# Patient Record
Sex: FEMALE | Race: WHITE | Employment: OTHER | URBAN - METROPOLITAN AREA
[De-identification: names, ages, dates, MRNs, and addresses within clinical notes are randomized per-mention and may not be internally consistent; named-entity substitution may affect disease eponyms.]

---

## 2017-09-20 ENCOUNTER — HOSPITAL ENCOUNTER (OUTPATIENT)
Dept: MAMMOGRAPHY | Age: 77
Discharge: HOME | End: 2017-09-20
Attending: FAMILY MEDICINE
Payer: MEDICARE

## 2017-09-20 DIAGNOSIS — Z12.31 VISIT FOR SCREENING MAMMOGRAM: ICD-10-CM

## 2017-09-20 PROCEDURE — 77067 SCR MAMMO BI INCL CAD: CPT

## 2019-01-08 ENCOUNTER — HOSPITAL ENCOUNTER (OUTPATIENT)
Dept: MAMMOGRAPHY | Age: 79
Discharge: HOME | End: 2019-01-08
Attending: FAMILY MEDICINE
Payer: MEDICARE

## 2019-01-08 DIAGNOSIS — Z12.39 SCREENING BREAST EXAMINATION: ICD-10-CM

## 2019-01-08 PROCEDURE — 77067 SCR MAMMO BI INCL CAD: CPT

## 2019-01-23 ENCOUNTER — APPOINTMENT (OUTPATIENT)
Dept: ULTRASOUND IMAGING | Age: 79
DRG: 417 | End: 2019-01-23
Attending: EMERGENCY MEDICINE
Payer: MEDICARE

## 2019-01-23 ENCOUNTER — APPOINTMENT (OUTPATIENT)
Dept: GENERAL RADIOLOGY | Age: 79
DRG: 417 | End: 2019-01-23
Attending: EMERGENCY MEDICINE
Payer: MEDICARE

## 2019-01-23 ENCOUNTER — APPOINTMENT (OUTPATIENT)
Dept: CT IMAGING | Age: 79
DRG: 417 | End: 2019-01-23
Attending: EMERGENCY MEDICINE
Payer: MEDICARE

## 2019-01-23 ENCOUNTER — HOSPITAL ENCOUNTER (INPATIENT)
Age: 79
LOS: 3 days | Discharge: HOME | DRG: 417 | End: 2019-01-26
Attending: EMERGENCY MEDICINE | Admitting: INTERNAL MEDICINE
Payer: MEDICARE

## 2019-01-23 DIAGNOSIS — K85.10 ACUTE BILIARY PANCREATITIS, UNSPECIFIED COMPLICATION STATUS: Primary | ICD-10-CM

## 2019-01-23 PROBLEM — R11.2 NAUSEA & VOMITING: Status: ACTIVE | Noted: 2019-01-23

## 2019-01-23 PROBLEM — R10.9 ABDOMINAL PAIN: Status: ACTIVE | Noted: 2019-01-23

## 2019-01-23 PROBLEM — E86.0 DEHYDRATION: Status: ACTIVE | Noted: 2019-01-23

## 2019-01-23 PROBLEM — D72.829 LEUKOCYTOSIS: Status: ACTIVE | Noted: 2019-01-23

## 2019-01-23 PROBLEM — K85.90 PANCREATITIS: Status: ACTIVE | Noted: 2019-01-23

## 2019-01-23 PROBLEM — R79.89 LFT ELEVATION: Status: ACTIVE | Noted: 2019-01-23

## 2019-01-23 LAB
ALBUMIN SERPL-MCNC: 3.7 G/DL (ref 3.5–5)
ALBUMIN/GLOB SERPL: 1 {RATIO} (ref 1.1–2.2)
ALP SERPL-CCNC: 181 U/L (ref 45–117)
ALT SERPL-CCNC: 192 U/L (ref 12–78)
ANION GAP SERPL CALC-SCNC: 13 MMOL/L (ref 5–15)
APPEARANCE UR: CLEAR
APTT PPP: 21.2 SEC (ref 22.1–32)
AST SERPL-CCNC: 354 U/L (ref 15–37)
ATRIAL RATE: 71 BPM
BACTERIA URNS QL MICRO: NEGATIVE /HPF
BASOPHILS # BLD: 0.1 K/UL (ref 0–0.1)
BASOPHILS NFR BLD: 1 % (ref 0–1)
BILIRUB SERPL-MCNC: 2.1 MG/DL (ref 0.2–1)
BILIRUB UR QL CFM: POSITIVE
BUN SERPL-MCNC: 24 MG/DL (ref 6–20)
BUN/CREAT SERPL: 23 (ref 12–20)
CALCIUM SERPL-MCNC: 9.4 MG/DL (ref 8.5–10.1)
CALCULATED P AXIS, ECG09: 144 DEGREES
CALCULATED R AXIS, ECG10: -25 DEGREES
CALCULATED T AXIS, ECG11: 150 DEGREES
CHLORIDE SERPL-SCNC: 101 MMOL/L (ref 97–108)
CHOLEST SERPL-MCNC: 137 MG/DL
CO2 SERPL-SCNC: 27 MMOL/L (ref 21–32)
COLOR UR: ABNORMAL
COMMENT, HOLDF: NORMAL
CREAT SERPL-MCNC: 1.06 MG/DL (ref 0.55–1.02)
DIAGNOSIS, 93000: NORMAL
DIFFERENTIAL METHOD BLD: ABNORMAL
EOSINOPHIL # BLD: 0 K/UL (ref 0–0.4)
EOSINOPHIL NFR BLD: 0 % (ref 0–7)
EPITH CASTS URNS QL MICRO: ABNORMAL /LPF
ERYTHROCYTE [DISTWIDTH] IN BLOOD BY AUTOMATED COUNT: 13.2 % (ref 11.5–14.5)
EST. AVERAGE GLUCOSE BLD GHB EST-MCNC: 154 MG/DL
GLOBULIN SER CALC-MCNC: 3.8 G/DL (ref 2–4)
GLUCOSE BLD STRIP.AUTO-MCNC: 120 MG/DL (ref 65–100)
GLUCOSE BLD STRIP.AUTO-MCNC: 140 MG/DL (ref 65–100)
GLUCOSE SERPL-MCNC: 161 MG/DL (ref 65–100)
GLUCOSE UR STRIP.AUTO-MCNC: NEGATIVE MG/DL
HBA1C MFR BLD: 7 % (ref 4.2–6.3)
HCT VFR BLD AUTO: 39.2 % (ref 35–47)
HDLC SERPL-MCNC: 54 MG/DL
HDLC SERPL: 2.5 {RATIO} (ref 0–5)
HGB BLD-MCNC: 12.3 G/DL (ref 11.5–16)
HGB UR QL STRIP: NEGATIVE
HYALINE CASTS URNS QL MICRO: ABNORMAL /LPF (ref 0–5)
IMM GRANULOCYTES # BLD AUTO: 0.1 K/UL (ref 0–0.04)
IMM GRANULOCYTES NFR BLD AUTO: 0 % (ref 0–0.5)
INR PPP: 1 (ref 0.9–1.1)
KETONES UR QL STRIP.AUTO: NEGATIVE MG/DL
LDLC SERPL CALC-MCNC: 50.2 MG/DL (ref 0–100)
LEUKOCYTE ESTERASE UR QL STRIP.AUTO: ABNORMAL
LIPASE SERPL-CCNC: >3000 U/L (ref 73–393)
LIPID PROFILE,FLP: ABNORMAL
LYMPHOCYTES # BLD: 2.2 K/UL (ref 0.8–3.5)
LYMPHOCYTES NFR BLD: 15 % (ref 12–49)
MCH RBC QN AUTO: 27.8 PG (ref 26–34)
MCHC RBC AUTO-ENTMCNC: 31.4 G/DL (ref 30–36.5)
MCV RBC AUTO: 88.7 FL (ref 80–99)
MONOCYTES # BLD: 1.1 K/UL (ref 0–1)
MONOCYTES NFR BLD: 7 % (ref 5–13)
NEUTS SEG # BLD: 11.5 K/UL (ref 1.8–8)
NEUTS SEG NFR BLD: 77 % (ref 32–75)
NITRITE UR QL STRIP.AUTO: NEGATIVE
NRBC # BLD: 0 K/UL (ref 0–0.01)
NRBC BLD-RTO: 0 PER 100 WBC
P-R INTERVAL, ECG05: 142 MS
PH UR STRIP: 5 [PH] (ref 5–8)
PLATELET # BLD AUTO: 328 K/UL (ref 150–400)
PMV BLD AUTO: 10 FL (ref 8.9–12.9)
POTASSIUM SERPL-SCNC: 3.7 MMOL/L (ref 3.5–5.1)
PROT SERPL-MCNC: 7.5 G/DL (ref 6.4–8.2)
PROT UR STRIP-MCNC: ABNORMAL MG/DL
PROTHROMBIN TIME: 10.4 SEC (ref 9–11.1)
Q-T INTERVAL, ECG07: 400 MS
QRS DURATION, ECG06: 88 MS
QTC CALCULATION (BEZET), ECG08: 434 MS
RBC # BLD AUTO: 4.42 M/UL (ref 3.8–5.2)
RBC #/AREA URNS HPF: ABNORMAL /HPF (ref 0–5)
SAMPLES BEING HELD,HOLD: NORMAL
SERVICE CMNT-IMP: ABNORMAL
SERVICE CMNT-IMP: ABNORMAL
SODIUM SERPL-SCNC: 141 MMOL/L (ref 136–145)
SP GR UR REFRACTOMETRY: 1.02 (ref 1–1.03)
THERAPEUTIC RANGE,PTTT: ABNORMAL SECS (ref 58–77)
TRIGL SERPL-MCNC: 164 MG/DL (ref ?–150)
TROPONIN I SERPL-MCNC: <0.05 NG/ML
UA: UC IF INDICATED,UAUC: ABNORMAL
UROBILINOGEN UR QL STRIP.AUTO: 1 EU/DL (ref 0.2–1)
VENTRICULAR RATE, ECG03: 71 BPM
VLDLC SERPL CALC-MCNC: 32.8 MG/DL
WBC # BLD AUTO: 14.9 K/UL (ref 3.6–11)
WBC URNS QL MICRO: ABNORMAL /HPF (ref 0–4)

## 2019-01-23 PROCEDURE — 84484 ASSAY OF TROPONIN QUANT: CPT

## 2019-01-23 PROCEDURE — 74011250636 HC RX REV CODE- 250/636: Performed by: INTERNAL MEDICINE

## 2019-01-23 PROCEDURE — 80053 COMPREHEN METABOLIC PANEL: CPT

## 2019-01-23 PROCEDURE — 85025 COMPLETE CBC W/AUTO DIFF WBC: CPT

## 2019-01-23 PROCEDURE — 74011636320 HC RX REV CODE- 636/320: Performed by: RADIOLOGY

## 2019-01-23 PROCEDURE — 83036 HEMOGLOBIN GLYCOSYLATED A1C: CPT

## 2019-01-23 PROCEDURE — 74011250636 HC RX REV CODE- 250/636: Performed by: EMERGENCY MEDICINE

## 2019-01-23 PROCEDURE — 96375 TX/PRO/DX INJ NEW DRUG ADDON: CPT

## 2019-01-23 PROCEDURE — 76705 ECHO EXAM OF ABDOMEN: CPT

## 2019-01-23 PROCEDURE — 36415 COLL VENOUS BLD VENIPUNCTURE: CPT

## 2019-01-23 PROCEDURE — 82962 GLUCOSE BLOOD TEST: CPT

## 2019-01-23 PROCEDURE — 65270000029 HC RM PRIVATE

## 2019-01-23 PROCEDURE — 96365 THER/PROPH/DIAG IV INF INIT: CPT

## 2019-01-23 PROCEDURE — 99285 EMERGENCY DEPT VISIT HI MDM: CPT

## 2019-01-23 PROCEDURE — 99284 EMERGENCY DEPT VISIT MOD MDM: CPT

## 2019-01-23 PROCEDURE — 74019 RADEX ABDOMEN 2 VIEWS: CPT

## 2019-01-23 PROCEDURE — 83690 ASSAY OF LIPASE: CPT

## 2019-01-23 PROCEDURE — 85730 THROMBOPLASTIN TIME PARTIAL: CPT

## 2019-01-23 PROCEDURE — 80061 LIPID PANEL: CPT

## 2019-01-23 PROCEDURE — 85610 PROTHROMBIN TIME: CPT

## 2019-01-23 PROCEDURE — 74177 CT ABD & PELVIS W/CONTRAST: CPT

## 2019-01-23 PROCEDURE — 81001 URINALYSIS AUTO W/SCOPE: CPT

## 2019-01-23 PROCEDURE — 74011000258 HC RX REV CODE- 258: Performed by: EMERGENCY MEDICINE

## 2019-01-23 PROCEDURE — 93005 ELECTROCARDIOGRAM TRACING: CPT

## 2019-01-23 PROCEDURE — 74011000250 HC RX REV CODE- 250: Performed by: INTERNAL MEDICINE

## 2019-01-23 RX ORDER — TIMOLOL MALEATE 2.5 MG/ML
1 SOLUTION/ DROPS OPHTHALMIC 2 TIMES DAILY
Status: DISCONTINUED | OUTPATIENT
Start: 2019-01-23 | End: 2019-01-23

## 2019-01-23 RX ORDER — ONDANSETRON 2 MG/ML
4 INJECTION INTRAMUSCULAR; INTRAVENOUS ONCE
Status: COMPLETED | OUTPATIENT
Start: 2019-01-23 | End: 2019-01-23

## 2019-01-23 RX ORDER — GUAIFENESIN 100 MG/5ML
81 LIQUID (ML) ORAL EVERY OTHER DAY
COMMUNITY

## 2019-01-23 RX ORDER — DIPHENHYDRAMINE HCL 25 MG
25 CAPSULE ORAL
Status: DISCONTINUED | OUTPATIENT
Start: 2019-01-23 | End: 2019-01-26 | Stop reason: HOSPADM

## 2019-01-23 RX ORDER — ONDANSETRON 2 MG/ML
4 INJECTION INTRAMUSCULAR; INTRAVENOUS
Status: DISCONTINUED | OUTPATIENT
Start: 2019-01-23 | End: 2019-01-26 | Stop reason: HOSPADM

## 2019-01-23 RX ORDER — OMEPRAZOLE 20 MG/1
20 CAPSULE, DELAYED RELEASE ORAL DAILY
COMMUNITY

## 2019-01-23 RX ORDER — MAGNESIUM SULFATE 100 %
4 CRYSTALS MISCELLANEOUS AS NEEDED
Status: DISCONTINUED | OUTPATIENT
Start: 2019-01-23 | End: 2019-01-26 | Stop reason: HOSPADM

## 2019-01-23 RX ORDER — INSULIN LISPRO 100 [IU]/ML
INJECTION, SOLUTION INTRAVENOUS; SUBCUTANEOUS EVERY 6 HOURS
Status: DISCONTINUED | OUTPATIENT
Start: 2019-01-23 | End: 2019-01-26

## 2019-01-23 RX ORDER — MORPHINE SULFATE 4 MG/ML
4 INJECTION INTRAVENOUS ONCE
Status: COMPLETED | OUTPATIENT
Start: 2019-01-23 | End: 2019-01-23

## 2019-01-23 RX ORDER — TIMOLOL MALEATE 2.5 MG/ML
1 SOLUTION/ DROPS OPHTHALMIC 2 TIMES DAILY
Status: DISCONTINUED | OUTPATIENT
Start: 2019-01-23 | End: 2019-01-23 | Stop reason: SDUPTHER

## 2019-01-23 RX ORDER — TRIAMTERENE AND HYDROCHLOROTHIAZIDE 37.5; 25 MG/1; MG/1
1 CAPSULE ORAL DAILY
COMMUNITY
End: 2019-01-23

## 2019-01-23 RX ORDER — NALOXONE HYDROCHLORIDE 0.4 MG/ML
0.4 INJECTION, SOLUTION INTRAMUSCULAR; INTRAVENOUS; SUBCUTANEOUS AS NEEDED
Status: DISCONTINUED | OUTPATIENT
Start: 2019-01-23 | End: 2019-01-26 | Stop reason: HOSPADM

## 2019-01-23 RX ORDER — ACETAMINOPHEN 325 MG/1
650 TABLET ORAL
Status: DISCONTINUED | OUTPATIENT
Start: 2019-01-23 | End: 2019-01-26 | Stop reason: HOSPADM

## 2019-01-23 RX ORDER — LISINOPRIL 20 MG/1
20 TABLET ORAL DAILY
COMMUNITY

## 2019-01-23 RX ORDER — PANTOPRAZOLE SODIUM 40 MG/1
40 TABLET, DELAYED RELEASE ORAL DAILY
Status: DISCONTINUED | OUTPATIENT
Start: 2019-01-24 | End: 2019-01-26 | Stop reason: HOSPADM

## 2019-01-23 RX ORDER — DEXTROSE 50 % IN WATER (D50W) INTRAVENOUS SYRINGE
25-50 AS NEEDED
Status: DISCONTINUED | OUTPATIENT
Start: 2019-01-23 | End: 2019-01-26 | Stop reason: HOSPADM

## 2019-01-23 RX ORDER — TIMOLOL MALEATE 5 MG/ML
1 SOLUTION/ DROPS OPHTHALMIC 2 TIMES DAILY
Status: DISCONTINUED | OUTPATIENT
Start: 2019-01-23 | End: 2019-01-26 | Stop reason: HOSPADM

## 2019-01-23 RX ORDER — ENOXAPARIN SODIUM 100 MG/ML
40 INJECTION SUBCUTANEOUS EVERY 24 HOURS
Status: DISCONTINUED | OUTPATIENT
Start: 2019-01-23 | End: 2019-01-25

## 2019-01-23 RX ORDER — ATORVASTATIN CALCIUM 20 MG/1
20 TABLET, FILM COATED ORAL
COMMUNITY

## 2019-01-23 RX ORDER — TIMOLOL MALEATE 5 MG/ML
1 SOLUTION/ DROPS OPHTHALMIC 2 TIMES DAILY
COMMUNITY

## 2019-01-23 RX ORDER — DEXTROSE, SODIUM CHLORIDE, AND POTASSIUM CHLORIDE 5; .45; .15 G/100ML; G/100ML; G/100ML
75 INJECTION INTRAVENOUS CONTINUOUS
Status: DISCONTINUED | OUTPATIENT
Start: 2019-01-23 | End: 2019-01-26

## 2019-01-23 RX ORDER — HYDROMORPHONE HYDROCHLORIDE 2 MG/ML
0.5 INJECTION, SOLUTION INTRAMUSCULAR; INTRAVENOUS; SUBCUTANEOUS
Status: DISCONTINUED | OUTPATIENT
Start: 2019-01-23 | End: 2019-01-26 | Stop reason: HOSPADM

## 2019-01-23 RX ADMIN — TIMOLOL MALEATE 1 DROP: 5 SOLUTION/ DROPS OPHTHALMIC at 17:31

## 2019-01-23 RX ADMIN — PIPERACILLIN SODIUM,TAZOBACTAM SODIUM 3.38 G: 3; .375 INJECTION, POWDER, FOR SOLUTION INTRAVENOUS at 09:33

## 2019-01-23 RX ADMIN — DEXTROSE MONOHYDRATE, SODIUM CHLORIDE, AND POTASSIUM CHLORIDE 125 ML/HR: 50; 4.5; 1.49 INJECTION, SOLUTION INTRAVENOUS at 20:11

## 2019-01-23 RX ADMIN — ENOXAPARIN SODIUM 40 MG: 40 INJECTION SUBCUTANEOUS at 12:09

## 2019-01-23 RX ADMIN — ONDANSETRON 4 MG: 2 INJECTION INTRAMUSCULAR; INTRAVENOUS at 07:46

## 2019-01-23 RX ADMIN — SODIUM CHLORIDE 1000 ML: 900 INJECTION, SOLUTION INTRAVENOUS at 07:46

## 2019-01-23 RX ADMIN — IOPAMIDOL 100 ML: 755 INJECTION, SOLUTION INTRAVENOUS at 10:16

## 2019-01-23 RX ADMIN — DEXTROSE MONOHYDRATE, SODIUM CHLORIDE, AND POTASSIUM CHLORIDE 125 ML/HR: 50; 4.5; 1.49 INJECTION, SOLUTION INTRAVENOUS at 12:09

## 2019-01-23 RX ADMIN — MORPHINE SULFATE 4 MG: 4 INJECTION, SOLUTION INTRAMUSCULAR; INTRAVENOUS at 07:46

## 2019-01-23 NOTE — PROGRESS NOTES
Problem: Pain  Goal: *Control of Pain  Outcome: Progressing Towards Goal  At this time controlled by pain medications given in the ER.

## 2019-01-23 NOTE — H&P
[facility]    Hospitalist Admission Note      NAME:  Abigail Jaquez   :   1940   MRN:  [id number]     PCP:  Martir Merino MD     Date/Time:  2019 11:26 AM          Subjective:     CHIEF COMPLAINT: abd pain    HISTORY OF PRESENT ILLNESS:     Ms. Star Ramírez is a 77 y.o.  female who presented to the Emergency Department complaining of abd pain. Sharp, mid upper gastric, radiating to back. Today associated with nausea and vomiting. Started 2 hours after chicken and candy bar lunch yesterday. Similar milder symptoms twice in last 2 months, both times about an hour after eating. ER workup shows pancreatitis, and US suggests gallstones. We will admit her for management. Past Medical History:   Diagnosis Date    GERD (gastroesophageal reflux disease)     Glaucoma     HTN (hypertension)     Hyperlipidemia         Past Surgical History:   Procedure Laterality Date    HX BREAST BIOPSY Left     HX ORTHOPAEDIC         Social History     Tobacco Use    Smoking status: Never Smoker    Smokeless tobacco: Never Used   Substance Use Topics    Alcohol use: No     Frequency: Never        Family History   Problem Relation Age of Onset    Diabetes Mother     COPD Mother     Coronary Artery Disease Father     Diabetes Father     Stroke Father       No Known Allergies     Prior to Admission medications    Medication Sig Start Date End Date Taking? Authorizing Provider   atorvastatin (LIPITOR) 20 mg tablet Take 20 mg by mouth daily. Yes Mayra, MD Nisreen   timolol (TIMOPTIC) 0.25 % ophthalmic solution Administer 1 Drop to both eyes two (2) times a day. Yes Mayra, MD Nisreen   triamterene-hydroCHLOROthiazide (DYAZIDE) 37.5-25 mg per capsule Take 1 Cap by mouth daily. Yes Nisreen Nunez MD   lisinopril (PRINIVIL, ZESTRIL) 20 mg tablet Take 20 mg by mouth daily. Yes Nisreen Nunez MD   aspirin 81 mg chewable tablet Take 81 mg by mouth daily.    Yes Nisreen Nunez MD omeprazole (PRILOSEC) 20 mg capsule Take 20 mg by mouth daily. Yes Other, MD Nisreen       Review of Systems:  (bold if positive, if negative)    Gen:  Eyes:  ENT:  CVS:  Pulm:  GI:  Abdominal pain, nausea, emesis,  :    MS:  Skin:  Psych:  Endo:    Hem:  Renal:    Neuro:        Objective:      VITALS:    Vital signs reviewed; most recent are:    Visit Vitals  /53 (BP 1 Location: Right arm, BP Patient Position: At rest)   Pulse 76   Temp 98.4 °F (36.9 °C)   Resp 16   Ht 5' 1\" (1.549 m)   Wt 77.6 kg (171 lb)   SpO2 100%   BMI 32.31 kg/m²     SpO2 Readings from Last 6 Encounters:   01/23/19 100%        No intake or output data in the 24 hours ending 01/23/19 1126     Exam:     Physical Exam:    Gen:  Obese, in no acute distress  HEENT:  Pink conjunctivae, PERRL, hearing intact to voice, moist mucous membranes  Neck:  Supple, without masses, thyroid non-tender  Resp:  No accessory muscle use, clear breath sounds without wheezes rales or rhonchi  Card:  No murmurs, normal S1, S2 without thrills, bruits or peripheral edema  Abd:  Soft, non-tender, non-distended, normoactive bowel sounds are present, no mass  Lymph:  No cervical or inguinal adenopathy  Musc:  No cyanosis or clubbing  Skin:  No rashes or ulcers, skin turgor is good  Neuro:  Cranial nerves are grossly intact, no focal motor weakness, follows commands appropriately  Psych:  Good insight, oriented to person, place and time, alert     Labs:    Recent Labs     01/23/19  0741   WBC 14.9*   HGB 12.3   HCT 39.2        Recent Labs     01/23/19  0741      K 3.7      CO2 27   *   BUN 24*   CREA 1.06*   CA 9.4   ALB 3.7   TBILI 2.1*   SGOT 354*   *     No results found for: GLUCPOC  No results for input(s): PH, PCO2, PO2, HCO3, FIO2 in the last 72 hours.   Recent Labs     01/23/19  0742   INR 1.0     All Micro Results     Procedure Component Value Units Date/Time    URINE CULTURE HOLD SAMPLE [[id number]]     Order Status:  Sent Specimen:  Urine           I have reviewed previous records       Assessment and Plan:      Pancreatitis / Leukocytosis / LFT elevation / Abdominal pain / Nausea & vomiting / hyperbilirubinemia - POA, presumed gallstone pancreatitis. Surgery consulted and awaiting CT and GI eval for possible ERCP. For now GI consult. NPO. IVF. Symptoms control. Elevated blood glucose - POA, no hx of DM. Check A1c. If Dx of DM will start diet and counseling. More likely this is just stress and pancreatitis. Dehydration - POA due to anorexia and vomiting. Hydrate and follow labs. Hypertension - Hold lisinopril and Dyazide in setting of dehydration and NPO. Hold ASA anticipating ERCP. Hyperlipidemia - Hold atorvastatin in setting of LFTs and NPO    Glaucoma - continue timolol.     GERD - continue PPI    Telemetry reviewed:   normal sinus rhythm    Risk of deterioration: high      Total time spent with patient: 79 Minutes Signed out to Dr Chano Rivera at 11:30 [address] discussed with: Patient, Family, Nursing Staff, Consultant/Specialist and >50% of time spent in counseling and coordination of care    Discussed:  Care Plan       ___________________________________________________    Attending Physician: Papito Lind MD

## 2019-01-23 NOTE — PROGRESS NOTES
Bedside shift change report given to Preet Quinn RN (oncoming nurse) by Ugo Elizabeth RN (offgoing nurse). Report included the following information SBAR, Kardex and MAR.

## 2019-01-23 NOTE — ED TRIAGE NOTES
Complains of abdominal pain since yesterday with vomiting this morning. Last BM yesterday. Denies diarrhea.

## 2019-01-23 NOTE — ED NOTES
TRANSFER - OUT REPORT:    Verbal report given to Irais Johnston (name) on Morena Michel  being transferred to [facility] (unit) for routine progression of care       Report consisted of patients Situation, Background, Assessment and   Recommendations(SBAR). Information from the following report(s) SBAR, ED Summary, [facility] and Recent Results was reviewed with the receiving nurse. Lines:   Peripheral IV 01/23/19 Right Antecubital (Active)   Site Assessment Clean, dry, & intact 1/23/2019  7:39 AM   Phlebitis Assessment 0 1/23/2019  7:39 AM   Infiltration Assessment 0 1/23/2019  7:39 AM   Dressing Status Clean, dry, & intact 1/23/2019  7:39 AM   Dressing Type Tape;Transparent 1/23/2019  7:39 AM   Hub Color/Line Status Pink;Flushed;Patent 1/23/2019  7:39 AM   Action Taken Blood drawn 1/23/2019  7:39 AM        Opportunity for questions and clarification was provided.       Patient transported with:   [employer]

## 2019-01-23 NOTE — PROGRESS NOTES
GI Note    CT scan results noted  Discussed with patient/family via phone results and need for ERCP. I have discussed possible ERCP, sphincterotomy, stone extraction, stent placement biopsy, alternatives, potential complications including but not limited to pancreatitis, bleeding, perforation requiring operative repair and/or blood transfusions. All questions answered.     ERCP scheduled for 1/24/19 at 12:30pm with Dr. Henok Garcia PA-C  01/23/19  3:19 PM  Zoraida Johnson MD

## 2019-01-23 NOTE — PROGRESS NOTES
[facility]: MED RECONCILIATION    Comments/Recommendations:    Patient was awake, alert and oriented with family by bedside   Patient verified no known drug allergies and updated preferred pharmacy   Patient is taking aspirin 81mg every other day, according to patient, PCP told her to start taking aspirin every other day due to excessive bruising.  Patient stated that she was taking 1/2 tablet of triamterene/HCTZ 37.5-25mg but PCP discontinued it about 2 days ago due to kidney function. Her PCP wanted to stop fluid pill until they test kidney function again in 3 months. PCP told patient to monitor her blood pressure regularly, patient checks everyday and it usually ranges from 135-140/70-80   Patient stated that she was taking 2 tablets of ibuprofen 200mg everyday, but stopped about a month ago due to renal function. If she does need pain medication, she takes tylenol.  Patient finished Z-RUDDY for sinus infection a week ago.  Patient takes 2 tablets of colchicine 0.6mg as needed for gout pain, last dose was over a week ago.  Patient did not take any of her morning medications    Medications added:     · AREDS 2    Medications removed:    · Triamterene/HCTZ 37.5-25mg-see above    Medications adjusted:    · Changed aspirin from once daily to every other day-see above    Information obtained from: Patient    Allergies: Patient has no known allergies. Prior to Admission Medications:     Medication Documentation Review Audit      Prior to Admission Medications   Prescriptions Last Dose Informant Patient Reported? Taking?   aspirin 81 mg chewable tablet 1/22/2019 at AM Self Yes Yes   Sig: Take 81 mg by mouth every other day. atorvastatin (LIPITOR) 20 mg tablet 1/22/2019 at PM Self Yes Yes   Sig: Take 20 mg by mouth nightly. lisinopril (PRINIVIL, ZESTRIL) 20 mg tablet 1/22/2019 at AM Self Yes Yes   Sig: Take 20 mg by mouth daily.    omeprazole (PRILOSEC) 20 mg capsule 1/22/2019 at AM Self Yes Yes   Sig: Take 20 mg by mouth daily. timolol (TIMOPTIC) 0.5 % ophthalmic solution 1/22/2019 at PM Self Yes Yes   Sig: Administer 1 Drop to both eyes two (2) times a day. vit A/vit C/vit E/zinc/copper (ICAPS AREDS PO) 1/22/2019 at AM Self Yes Yes   Sig: Take 1 Tab by mouth daily.       Facility-Administered Medications: None       Thank you,      Julius Bae, Pharmacy Student   Contact:   Reviewed and approved    Lay Rizzo, PharmD, BCPS

## 2019-01-23 NOTE — PROGRESS NOTES
.tfd  11:38 AM  Reason for Admission:   Pancreatitis               RRAT Score:    23              Resources/supports as identified by patient/family:   Pt lives w/ supportive family, has family nearby who can [address] facing patient (as identified by patient/family and CM): Finances/Medication cost?    No concerns               Transportation? Pt drives, family can assist w/ needs              Support system or lack thereof? Pt lives w/ supportive family                     Living arrangements? Pt lives in 1 story home w/ 1 entry step, Dtr  Vicente Rubio and  Pt's   reside w/ pt            Self-care/ADLs/Cognition? Pt reports to be independent w/ ADLs ambulatory w/o assistive device, fair health cognition          Current Advanced Directive/Advance Care Plan:  Pt does not have ACP,  is surrogate decision maker                           Plan for utilizing home health:    None, pt agreeable if needed                      Likelihood of readmission:  Moderate                 Transition of Care Plan:     Hospital admission for medical and surgical management,  d/c when stable to home w/ family assistance, f/u w/ surgery and PCP         Care Management Interventions  PCP Verified by CM: Sarah Griffiths MD, no NN, last visit 1/2019)  Palliative Care Criteria Met (RRAT>21 & CHF Dx)?: No  Mode of Transport at Discharge: Self(Family/Dtr)  Physical Therapy Consult: No  Occupational Therapy Consult: No  Current Support Network: Lives with Spouse, Own Home(pt lives in pvt residence w/  and Dtr, reports to be ambulatory, independent and drives)  Confirm Follow Up Transport: Self  Discharge Location  Discharge Placement: Home with family assistance      Floor CM will follow for d/c needs.     Kearney Goltz

## 2019-01-23 NOTE — ED PROVIDER NOTES
77 y.o. female with no significant past medical history who presents ambulatory to the ED accompanied by family member, with chief complaint of upper abdominal pain. Pt complains of upper abdominal pain that started ~1400 yesterday. She states that the pain is mostly in the upper abdomen, but radiates to the lower abdomen. Pt describes her pain as \"aching\" in quality and rates her current pain as 10/10 in intensity. Notes that she is able to ambulate, but states that ambulating does not provide any relief. Pt also reports that she vomited \"brown-miller\" emesis x 2 yesterday. Pt reports that patient has had two episodes of similar abdominal pain in the past, but states that these episodes both started at night and had resolved by the time she woke up the next morning. Notes that current pain is constant and did not resolve after sleep. She also states that she has been unable to pass gas this morning. Notes that her last bowel movement was yesterday. Pt denies any history of gall stones, cholecystectomy or any other abdominal surgeries. She notes that she currently takes Omeprazole. Pt specifically denies hematemesis and diarrhea. There are no other acute medical concerns at this time. Social hx: Negative for Tobacco use  PCP: Vasiliy Carpio MD    Note written by Angela Muniz, as dictated by Kristin Butterfield MD 7:34 AM      The history is provided by the patient and medical records. No  was used. No past medical history on file. Past Surgical History:   Procedure Laterality Date    HX BREAST BIOPSY Left 2007         No family history on file.     Social History     Socioeconomic History    Marital status:      Spouse name: Not on file    Number of children: Not on file    Years of education: Not on file    Highest education level: Not on file   Social Needs    Financial resource strain: Not on file    Food insecurity - worry: Not on file    Food insecurity - inability: Not on file    Transportation needs - medical: Not on file   Care at Hand needs - non-medical: Not on file   Occupational History    Not on file   Tobacco Use    Smoking status: Not on file   Substance and Sexual Activity    Alcohol use: Not on file    Drug use: Not on file    Sexual activity: Not on file   Other Topics Concern    Not on file   Social History Narrative    Not on file         ALLERGIES: Patient has no known allergies. Review of Systems   Gastrointestinal: Positive for abdominal pain (upper) and vomiting. Negative for diarrhea. Negative for hematemesis. All other systems reviewed and are negative. Vitals:    01/23/19 0726   BP: 152/53   Pulse: 76   Resp: 16   Temp: 98.4 °F (36.9 °C)   SpO2: 99%   Weight: 77.6 kg (171 lb)   Height: 5' 1\" (1.549 m)            Physical Exam   Constitutional: She is oriented to person, place, and time. She appears well-developed and well-nourished. No distress. HENT:   Head: Normocephalic and atraumatic. Eyes: Conjunctivae are normal. No scleral icterus. Neck: Neck supple. No tracheal deviation present. Cardiovascular: Normal rate, regular rhythm, normal heart sounds and intact distal pulses. Exam reveals no gallop and no friction rub. No murmur heard. Pulmonary/Chest: Effort normal and breath sounds normal. She has no wheezes. She has no rales. Abdominal: Soft. She exhibits no distension. There is tenderness in the right upper quadrant and epigastric area. There is guarding (slight). There is no rebound. Musculoskeletal: She exhibits no edema. Neurological: She is alert and oriented to person, place, and time. Skin: Skin is warm and dry. No rash noted. Psychiatric: She has a normal mood and affect. Nursing note and vitals reviewed.   Note written by Angela Goodman, as dictated by Robbin Trevino MD 7:34 AM    MDM  Number of Diagnoses or Management Options  Acute biliary pancreatitis, unspecified complication status:      Amount and/or Complexity of Data Reviewed  Clinical lab tests: ordered and reviewed  Tests in the radiology section of CPT®: ordered and reviewed  Tests in the medicine section of CPT®: ordered and reviewed           Procedures    ED EKG interpretation:  Time: 08:21  Rhythm: possible ectopic atrial rhythm with undetermined rhythm irregularity; Rate (approx.): 71 bpm; Axis: unusual P axis; ST/T wave: T wave abnormality, consider lateral ischemia; Note written by Angela Bustillo, as dictated by Will Bernal MD 9:09 AM    CONSULT NOTE:  10:16 AM Will Bernal MD spoke with Dr. Sue Goltz, Consult for General Surgery. Discussed available diagnostic tests and clinical findings. Dr. Sue Goltz recommends admitting the patient through hospitalist. He reports that after patient's pancreatitis is resolved, they will perform cholecystectomy. CONSULT NOTE:  10:33 AM Will Bernal MD spoke with Dr. Kevin Patel MD, Consult for Hospitalist via [facility] Text. Discussed available diagnostic tests and clinical findings. Dr. Neto Ashley will evaluate the patient for admission to the hospital.    10:33 AM  Patient is being admitted to the hospital.  The results of their tests and reasons for their admission have been discussed with them and/or available family. They convey agreement and understanding for the need to be admitted and for their admission diagnosis. Consultation will be made now with the inpatient physician for hospitalization.       Total critical care time spent exclusive of procedures: 35   minutes

## 2019-01-23 NOTE — ED NOTES
Pt Throughput: Receiving 5th floor Charge RN made aware of patient's bed placement.      Jimmy Hood RN

## 2019-01-23 NOTE — CONSULTS
Gastroenterology Consultation Note      Admit Date: 1/23/2019  Consult Date: 1/23/2019   I greatly appreciate your asking me to see Alberto Matthews, thank you very much for the opportunity to participate in her care. Narrative Assessment and Plan   · Acute pancreatitis  · Cholelithiasis/biliary sludge  · Hyperbilirubinemia  · Abnormal liver enzymes    70yo female presents with epigastric pain, nausea and vomiting. Findings consistent with acute pancreatitis likely secondary to cholelithiasis. Abdominal US without signs of biliary dilation or obstruction but limited visualization of CBD. Plan  - supportive care for pancreatitis: NPO, IV fluids, prn analgesics and antiemetics  - check triglyceride level  - await results of CT scan  - trend liver enzymes  - pending results of imaging will determine if additional evaluation indicated: if inconclusive then MRCP, if clear evidence of choledocholithiasis will need ERCP  - following    Attending evaluation (Dr. Neeta Edwards) to follow  ----------------  Neema Ware. Neeta Edwards MD  [phone] office  [phone] voicemail   I have personally reviewed the history and independently examined the patient. I have reviewed the chart and agree with the documentation recorded by the Mid Level Provider, including the assessment, treatment plan, and disposition. ASSESSMENT AND PLAN:  Gallstone pancreatitis. Intermediate risk for choledocholithiasis based on data set. Plan is CT as already ordered, observation and supportive care. Await results CT before deciding MRCP, lap jamarcus with IOC, or ERCP (although the data at current would suggest proceeding directly to ERCP not warranted.)    Following. Alysia Solorzano MD        Subjective:     Chief Complaint: abdominal pain    History of Present Illness: Alberto Matthews is a 77 y.o. female with PMH of DM, GERD, HTN, HLD who presents with complaints of epigastric pain, nausea, and vomiting.  She states that yesterday around 2pm she developed a sudden onset of epigastric pain that radiated into her back. Pain was worse with laying down and improved with sitting up in chair. Associated with nausea and several episodes of nonbilious emesis. She recalls having two prior episodes a few months ago but symptoms were mild and resolved spontaneously. Denies associated fever, chills, diarrhea. No recent abdominal trauma. Was taking NSAIDs in the past but none recently. Only new medication is colchicine as needed for gout (not associated with pancreatitis). Denies alcohol use. No know family history of pancreatic cancer. In ED: WBC 14, Tbili 2.1, , , , lipase >3000  Ultrasound shows biliary sludge and stones, no evidence of biliary obstruction/dilation    PCP:  Palmer Johnson MD    Past Medical History:   Diagnosis Date    DM type 2 causing renal disease ([facility] [place] [address]     GERD (gastroesophageal reflux disease)     Glaucoma     HTN (hypertension)     Hyperlipidemia         Past Surgical History:   Procedure Laterality Date    HX BREAST BIOPSY Left 2007    HX ORTHOPAEDIC         Social History     Tobacco Use    Smoking status: Never Smoker    Smokeless tobacco: Never Used   Substance Use Topics    Alcohol use: No     Frequency: Never        History reviewed. No pertinent family history. No Known Allergies         Home Medications:  Prior to Admission Medications   Prescriptions Last Dose Informant Patient Reported? Taking?   aspirin 81 mg chewable tablet 1/22/2019 at Unknown time  Yes Yes   Sig: Take 81 mg by mouth daily. atorvastatin (LIPITOR) 20 mg tablet 1/22/2019 at Unknown time  Yes Yes   Sig: Take 20 mg by mouth daily. lisinopril (PRINIVIL, ZESTRIL) 20 mg tablet 1/22/2019 at Unknown time  Yes Yes   Sig: Take 20 mg by mouth daily. omeprazole (PRILOSEC) 20 mg capsule 1/22/2019 at Unknown time  Yes Yes   Sig: Take 20 mg by mouth daily.    timolol (TIMOPTIC) 0.25 % ophthalmic solution 1/22/2019 at Unknown time  Yes Yes   Sig: Administer 1 Drop to both eyes two (2) times a day. triamterene-hydroCHLOROthiazide (DYAZIDE) 37.5-25 mg per capsule 1/22/2019 at Unknown time  Yes Yes   Sig: Take 1 Cap by mouth daily. Facility-Administered Medications: None       Hospital Medications:  Current Facility-Administered Medications   Medication Dose Route Frequency    iopamidol (ISOVUE-370) 76 % injection        dextrose 5% - 0.45% NaCl with KCl 20 mEq/L infusion  125 mL/hr IntraVENous CONTINUOUS    timolol (TIMOPTIC) 0.25% ophthalmic solution  1 Drop Both Eyes BID    omeprazole (PRILOSEC) capsule 20 mg  20 mg Oral DAILY    naloxone (NARCAN) injection 0.4 mg  0.4 mg IntraVENous PRN    glucose chewable tablet 16 g  4 Tab Oral PRN    dextrose (D50W) injection syrg 12.5-25 g  25-50 mL IntraVENous PRN    glucagon (GLUCAGEN) injection 1 mg  1 mg IntraMUSCular PRN    acetaminophen (TYLENOL) tablet 650 mg  650 mg Oral Q4H PRN    HYDROmorphone (PF) (DILAUDID) injection 0.5 mg  0.5 mg IntraVENous Q4H PRN    diphenhydrAMINE (BENADRYL) capsule 25 mg  25 mg Oral Q4H PRN    ondansetron (ZOFRAN) injection 4 mg  4 mg IntraVENous Q4H PRN    enoxaparin (LOVENOX) injection 40 mg  40 mg SubCUTAneous Q24H    insulin lispro (HUMALOG) injection   SubCUTAneous Q6H     Current Outpatient Medications   Medication Sig    atorvastatin (LIPITOR) 20 mg tablet Take 20 mg by mouth daily.  timolol (TIMOPTIC) 0.25 % ophthalmic solution Administer 1 Drop to both eyes two (2) times a day.  triamterene-hydroCHLOROthiazide (DYAZIDE) 37.5-25 mg per capsule Take 1 Cap by mouth daily.  lisinopril (PRINIVIL, ZESTRIL) 20 mg tablet Take 20 mg by mouth daily.  aspirin 81 mg chewable tablet Take 81 mg by mouth daily.  omeprazole (PRILOSEC) 20 mg capsule Take 20 mg by mouth daily.        Review of Systems: Admission ROS by Cindy Pelaez MD from 1/23/2019 were reviewed with the patient and changes (other than per HPI) include: none      Objective:     Physical Exam:  Visit Vitals  /53 (BP 1 Location: Right arm, BP Patient Position: At rest)   Pulse 76   Temp 98.4 °F (36.9 °C)   Resp 16   Ht 5' 1\" (1.549 m)   Wt 77.6 kg (171 lb)   SpO2 100%   BMI 32.31 kg/m²     SpO2 Readings from Last 6 Encounters:   01/23/19 100%        No intake or output data in the 24 hours ending 01/23/19 1107   General: no distress, comfortable  Skin:  No jaundice  HEENT: Pupils equal, sclera anicteric, oropharynx with no gross lesions  Cardiovascular: No abnormal audible heart sounds, well perfused, no edema  Respiratory:  No abnormal audible breath sounds, normal respiratory effort, no throacic deformity  GI:  Bowel sounds present, soft, nondistended, mild tenderness in CRISTEL to deep palpation  Musculoskeletal:  No skeletal deformity nor acute arthritis noted. Neurological:  CN II-XII grossly intact, no focal deficits  Psychiatric:  Normal affect, memory intact, appears to have insight into current illness    Laboratory:    Recent Results (from the past 24 hour(s))   SAMPLES BEING HELD    Collection Time: 01/23/19  7:41 AM   Result Value Ref Range    SAMPLES BEING HELD sst     COMMENT        Add-on orders for these samples will be processed based on acceptable specimen integrity and analyte stability, which may vary by analyte. CBC WITH AUTOMATED DIFF    Collection Time: 01/23/19  7:41 AM   Result Value Ref Range    WBC 14.9 (H) 3.6 - 11.0 K/uL    RBC 4.42 3.80 - 5.20 M/uL    HGB 12.3 11.5 - 16.0 g/dL    HCT 39.2 35.0 - 47.0 %    MCV 88.7 80.0 - 99.0 FL    MCH 27.8 26.0 - 34.0 PG    MCHC 31.4 30.0 - 36.5 g/dL    RDW 13.2 11.5 - 14.5 %    PLATELET 535 006 - 981 K/uL    MPV 10.0 8.9 - 12.9 FL    NRBC 0.0 0  WBC    ABSOLUTE NRBC 0.00 0.00 - 0.01 K/uL    NEUTROPHILS 77 (H) 32 - 75 %    LYMPHOCYTES 15 12 - 49 %    MONOCYTES 7 5 - 13 %    EOSINOPHILS 0 0 - 7 %    BASOPHILS 1 0 - 1 %    IMMATURE GRANULOCYTES 0 0.0 - 0.5 %    ABS.  NEUTROPHILS 11.5 (H) 1.8 - 8.0 K/UL    ABS. LYMPHOCYTES 2.2 0.8 - 3.5 K/UL    ABS. MONOCYTES 1.1 (H) 0.0 - 1.0 K/UL    ABS. EOSINOPHILS 0.0 0.0 - 0.4 K/UL    ABS. BASOPHILS 0.1 0.0 - 0.1 K/UL    ABS. IMM. GRANS. 0.1 (H) 0.00 - 0.04 K/UL    DF AUTOMATED     LIPASE    Collection Time: 01/23/19  7:41 AM   Result Value Ref Range    Lipase >3,000 (H) 73 - 798 U/L   METABOLIC PANEL, COMPREHENSIVE    Collection Time: 01/23/19  7:41 AM   Result Value Ref Range    Sodium 141 136 - 145 mmol/L    Potassium 3.7 3.5 - 5.1 mmol/L    Chloride 101 97 - 108 mmol/L    CO2 27 21 - 32 mmol/L    Anion gap 13 5 - 15 mmol/L    Glucose 161 (H) 65 - 100 mg/dL    BUN 24 (H) 6 - 20 MG/DL    Creatinine 1.06 (H) 0.55 - 1.02 MG/DL    BUN/Creatinine ratio 23 (H) 12 - 20      GFR est AA >60 >60 ml/min/1.73m2    GFR est non-AA 50 (L) >60 ml/min/1.73m2    Calcium 9.4 8.5 - 10.1 MG/DL    Bilirubin, total 2.1 (H) 0.2 - 1.0 MG/DL    ALT (SGPT) 192 (H) 12 - 78 U/L    AST (SGOT) 354 (H) 15 - 37 U/L    Alk.  phosphatase 181 (H) 45 - 117 U/L    Protein, total 7.5 6.4 - 8.2 g/dL    Albumin 3.7 3.5 - 5.0 g/dL    Globulin 3.8 2.0 - 4.0 g/dL    A-G Ratio 1.0 (L) 1.1 - 2.2     TROPONIN I    Collection Time: 01/23/19  7:41 AM   Result Value Ref Range    Troponin-I, Qt. <0.05 <0.05 ng/mL   URINALYSIS W/ REFLEX CULTURE    Collection Time: 01/23/19  7:42 AM   Result Value Ref Range    Color DARK YELLOW      Appearance CLEAR CLEAR      Specific gravity 1.019 1.003 - 1.030      pH (UA) 5.0 5.0 - 8.0      Protein TRACE (A) NEG mg/dL    Glucose NEGATIVE  NEG mg/dL    Ketone NEGATIVE  NEG mg/dL    Blood NEGATIVE  NEG      Urobilinogen 1.0 0.2 - 1.0 EU/dL    Nitrites NEGATIVE  NEG      Leukocyte Esterase SMALL (A) NEG      WBC 0-4 0 - 4 /hpf    RBC 0-5 0 - 5 /hpf    Epithelial cells FEW FEW /lpf    Bacteria NEGATIVE  NEG /hpf    UA:UC IF INDICATED CULTURE NOT INDICATED BY UA RESULT CNI      Hyaline cast 0-2 0 - 5 /lpf   PTT    Collection Time: 01/23/19  7:42 AM   Result Value Ref Range    aPTT 21.2 (L) 22.1 - 32.0 sec    aPTT, therapeutic range     58.0 - 77.0 SECS   PROTHROMBIN TIME + INR    Collection Time: 01/23/19  7:42 AM   Result Value Ref Range    INR 1.0 0.9 - 1.1      Prothrombin time 10.4 9.0 - 11.1 sec   BILIRUBIN, CONFIRM    Collection Time: 01/23/19  7:42 AM   Result Value Ref Range    Bilirubin UA, confirm POSITIVE (A) NEG     EKG, 12 LEAD, INITIAL    Collection Time: 01/23/19  8:21 AM   Result Value Ref Range    Ventricular Rate 71 BPM    Atrial Rate 71 BPM    P-R Interval 142 ms    QRS Duration 88 ms    Q-T Interval 400 ms    QTC Calculation (Bezet) 434 ms    Calculated P Axis 144 degrees    Calculated R Axis -25 degrees    Calculated T Axis 150 degrees    Diagnosis       Unusual P axis, possible ectopic atrial rhythm with undetermined rhythm   irregularity  T wave abnormality, consider lateral ischemia  Abnormal ECG  No previous ECGs available           Assessment/Plan:     Principal Problem:    Pancreatitis (1/23/2019)    Active Problems:    Leukocytosis (1/23/2019)      DM type 2 causing renal disease (HCC) ()      Dehydration (1/23/2019)      LFT elevation (1/23/2019)      Abdominal pain (1/23/2019)      Nausea & vomiting (1/23/2019)         See above narrative for full detail.     Deneen Coleman PA-C  01/23/19  11:07 AM

## 2019-01-23 NOTE — CONSULTS
Surgery Consult    Subjective:     I was asked to see this patient by Dr. Selma Luna for management of gallstone pancreatitis. Author Candy is a 77 y.o. female w/ PMH of HTN, hyperlipidemia, hysterectomy who presented to the ED with a 1 day history of severe epigastric pain and N/V. States pain began yesterday and was severe with radiation into her back. It was accompanied by N/V. She denies fever, chills, hematemesis, BRBPR. She states several months ago she had 2 episodes of similar pain, although both were short-lived and less severe. ED work up notable for leukocytosis, hyperbilirubinemia of 2, elevated lipase, and US showing cholelithiasis. CT demonstrated common bile duct dilation of 6 mm with a possible stone at the ampulla. Patient denies hx of pancreatitis. She denies ETOH use or recent change in medications. History reviewed. No pertinent past medical history. Past Surgical History:   Procedure Laterality Date    HX BREAST BIOPSY Left 2007    HX ORTHOPAEDIC        History reviewed. No pertinent family history. Social History     Socioeconomic History    Marital status:      Spouse name: Not on file    Number of children: Not on file    Years of education: Not on file    Highest education level: Not on file   Tobacco Use    Smoking status: Never Smoker    Smokeless tobacco: Never Used   Substance and Sexual Activity    Alcohol use: No     Frequency: Never      Current Facility-Administered Medications   Medication Dose Route Frequency    iopamidol (ISOVUE-370) 76 % injection         Current Outpatient Medications   Medication Sig    atorvastatin (LIPITOR) 20 mg tablet Take 20 mg by mouth daily.  timolol (TIMOPTIC) 0.25 % ophthalmic solution Administer 1 Drop to both eyes two (2) times a day.  triamterene-hydroCHLOROthiazide (DYAZIDE) 37.5-25 mg per capsule Take 1 Cap by mouth daily.  lisinopril (PRINIVIL, ZESTRIL) 20 mg tablet Take 20 mg by mouth daily.     aspirin 81 mg chewable tablet Take 81 mg by mouth daily.  omeprazole (PRILOSEC) 20 mg capsule Take 20 mg by mouth daily. No Known Allergies    Review of Systems:REVIEW OF SYSTEMS:     []     Unable to obtain  ROS due to  []    mental status change  []    sedated   []    intubated   [x]    Total of 12 systems reviewed as follows:    Constitutional: neg for fevers, chills, weight loss, malaise  Eyes: negative for blurry vision  Ears, nose, mouth, throat, and face: negative for sore throat  Respiratory: negative for SOB  Cardiovascular: negative for CP  Gastrointestinal: + for nausea, vomiting, abdominal pain, negative for diarrhea, constipation, melena, hematochezia  Genitourinary: negative for dysuria  Integument/breast: neg for skin rash  Hematologic/lymphatic: neg for bruising  Musculoskeletal: negative for muscle aches  Neurological: no dizziness or h/a    Objective:        Patient Vitals for the past 8 hrs:   BP Temp Pulse Resp SpO2 Height Weight   19 0915 -- -- -- -- 100 % -- --   19 0900 -- -- -- -- 97 % -- --   19 0845 -- -- -- -- 99 % -- --   19 0815 -- -- -- -- 97 % -- --   19 0800 -- -- -- -- 99 % -- --   19 0726 152/53 98.4 °F (36.9 °C) 76 16 99 % 5' 1\" (1.549 m) 77.6 kg (171 lb)       Temp (24hrs), Av.4 °F (36.9 °C), Min:98.4 °F (36.9 °C), Max:98.4 °F (36.9 °C)      Physical Exam:  General:  Alert, cooperative, no distress, appears stated age. Eyes:  Conjunctivae/corneas clear. Nose: Nares normal. Septum midline   Mouth/Throat: Lips, mucosa, and tongue normal.    Neck: Supple, symmetrical, trachea midline   Lungs:   Clear to auscultation bilaterally. Heart:  Regular rate and rhythm   Abdomen:   Soft, non-tender, non-distended, no rebound, no guarding, normal bowel sounds   Extremities: Extremities normal, atraumatic, no cyanosis or edema.    Skin: Skin color, texture, turgor normal. No rashes or lesions   Neuro: Alert, oriented, speech clear     Labs: Recent Labs     01/23/19  0741   WBC 14.9*   HGB 12.3   HCT 39.2        Recent Labs     01/23/19  0741      K 3.7      CO2 27   *   BUN 24*   CREA 1.06*   CA 9.4   ALB 3.7   TBILI 2.1*   SGOT 354*   *     Recent Labs     01/23/19  0742   INR 1.0         Assessment and Plan:     Acute pancreatitis- likely 2/2 gallstones  Hyperbilirubinemia    Epigastric pain, elevated lipase and LFTs consistent with gallstone pancreatitis. CT with common bile duct dilatation   Admit to medical service, continue supportive care, trend LFTs. Plan for ERCP with Dr. Eduarda Majano tomorrow. Recommend laparoscopic cholecystectomy prior to discharge home, once acute pancreatitis has resolved.       Kimberly Cortes MD

## 2019-01-24 ENCOUNTER — ANESTHESIA (OUTPATIENT)
Dept: ENDOSCOPY | Age: 79
DRG: 417 | End: 2019-01-24
Payer: MEDICARE

## 2019-01-24 ENCOUNTER — APPOINTMENT (OUTPATIENT)
Dept: GENERAL RADIOLOGY | Age: 79
DRG: 417 | End: 2019-01-24
Attending: SPECIALIST
Payer: MEDICARE

## 2019-01-24 ENCOUNTER — ANESTHESIA EVENT (OUTPATIENT)
Dept: ENDOSCOPY | Age: 79
DRG: 417 | End: 2019-01-24
Payer: MEDICARE

## 2019-01-24 LAB
ALBUMIN SERPL-MCNC: 2.7 G/DL (ref 3.5–5)
ALBUMIN/GLOB SERPL: 0.9 {RATIO} (ref 1.1–2.2)
ALP SERPL-CCNC: 141 U/L (ref 45–117)
ALT SERPL-CCNC: 126 U/L (ref 12–78)
ANION GAP SERPL CALC-SCNC: 9 MMOL/L (ref 5–15)
AST SERPL-CCNC: 110 U/L (ref 15–37)
BASOPHILS # BLD: 0.1 K/UL (ref 0–0.1)
BASOPHILS NFR BLD: 1 % (ref 0–1)
BILIRUB SERPL-MCNC: 0.4 MG/DL (ref 0.2–1)
BUN SERPL-MCNC: 13 MG/DL (ref 6–20)
BUN/CREAT SERPL: 14 (ref 12–20)
CALCIUM SERPL-MCNC: 8 MG/DL (ref 8.5–10.1)
CHLORIDE SERPL-SCNC: 105 MMOL/L (ref 97–108)
CO2 SERPL-SCNC: 26 MMOL/L (ref 21–32)
CREAT SERPL-MCNC: 0.92 MG/DL (ref 0.55–1.02)
DIFFERENTIAL METHOD BLD: ABNORMAL
EOSINOPHIL # BLD: 0.1 K/UL (ref 0–0.4)
EOSINOPHIL NFR BLD: 2 % (ref 0–7)
ERYTHROCYTE [DISTWIDTH] IN BLOOD BY AUTOMATED COUNT: 13.4 % (ref 11.5–14.5)
GLOBULIN SER CALC-MCNC: 3.1 G/DL (ref 2–4)
GLUCOSE BLD STRIP.AUTO-MCNC: 126 MG/DL (ref 65–100)
GLUCOSE BLD STRIP.AUTO-MCNC: 134 MG/DL (ref 65–100)
GLUCOSE BLD STRIP.AUTO-MCNC: 153 MG/DL (ref 65–100)
GLUCOSE BLD STRIP.AUTO-MCNC: 98 MG/DL (ref 65–100)
GLUCOSE SERPL-MCNC: 137 MG/DL (ref 65–100)
HCT VFR BLD AUTO: 31.9 % (ref 35–47)
HGB BLD-MCNC: 9.8 G/DL (ref 11.5–16)
IMM GRANULOCYTES # BLD AUTO: 0 K/UL (ref 0–0.04)
IMM GRANULOCYTES NFR BLD AUTO: 0 % (ref 0–0.5)
LIPASE SERPL-CCNC: 425 U/L (ref 73–393)
LYMPHOCYTES # BLD: 1.5 K/UL (ref 0.8–3.5)
LYMPHOCYTES NFR BLD: 19 % (ref 12–49)
MCH RBC QN AUTO: 27.7 PG (ref 26–34)
MCHC RBC AUTO-ENTMCNC: 30.7 G/DL (ref 30–36.5)
MCV RBC AUTO: 90.1 FL (ref 80–99)
MONOCYTES # BLD: 0.9 K/UL (ref 0–1)
MONOCYTES NFR BLD: 11 % (ref 5–13)
NEUTS SEG # BLD: 5.6 K/UL (ref 1.8–8)
NEUTS SEG NFR BLD: 68 % (ref 32–75)
NRBC # BLD: 0 K/UL (ref 0–0.01)
NRBC BLD-RTO: 0 PER 100 WBC
PLATELET # BLD AUTO: 191 K/UL (ref 150–400)
PMV BLD AUTO: 9.9 FL (ref 8.9–12.9)
POTASSIUM SERPL-SCNC: 4.3 MMOL/L (ref 3.5–5.1)
PROT SERPL-MCNC: 5.8 G/DL (ref 6.4–8.2)
RBC # BLD AUTO: 3.54 M/UL (ref 3.8–5.2)
SERVICE CMNT-IMP: ABNORMAL
SERVICE CMNT-IMP: NORMAL
SODIUM SERPL-SCNC: 140 MMOL/L (ref 136–145)
WBC # BLD AUTO: 8.2 K/UL (ref 3.6–11)

## 2019-01-24 PROCEDURE — 76060000031 HC ANESTHESIA FIRST 0.5 HR: Performed by: SPECIALIST

## 2019-01-24 PROCEDURE — 36415 COLL VENOUS BLD VENIPUNCTURE: CPT

## 2019-01-24 PROCEDURE — 77030010603 HC BLN DEV INFL BSC -B: Performed by: SPECIALIST

## 2019-01-24 PROCEDURE — C1726 CATH, BAL DIL, NON-VASCULAR: HCPCS | Performed by: SPECIALIST

## 2019-01-24 PROCEDURE — 74011250636 HC RX REV CODE- 250/636: Performed by: SPECIALIST

## 2019-01-24 PROCEDURE — 77030032490 HC SLV COMPR SCD KNE COVD -B

## 2019-01-24 PROCEDURE — 74011250636 HC RX REV CODE- 250/636: Performed by: INTERNAL MEDICINE

## 2019-01-24 PROCEDURE — 85025 COMPLETE CBC W/AUTO DIFF WBC: CPT

## 2019-01-24 PROCEDURE — 74011000250 HC RX REV CODE- 250

## 2019-01-24 PROCEDURE — 77030026438 HC STYL ET INTUB CARD -A: Performed by: ANESTHESIOLOGY

## 2019-01-24 PROCEDURE — 74011636320 HC RX REV CODE- 636/320: Performed by: SPECIALIST

## 2019-01-24 PROCEDURE — 83690 ASSAY OF LIPASE: CPT

## 2019-01-24 PROCEDURE — 76040000019: Performed by: SPECIALIST

## 2019-01-24 PROCEDURE — 77030031656 HC FCPS ENDO GRSP DISP BSC -B: Performed by: SPECIALIST

## 2019-01-24 PROCEDURE — 77030010104 HC SEAL PRT ENDOSC BYRN -B: Performed by: SPECIALIST

## 2019-01-24 PROCEDURE — 77030011761 HC SPHNTOM BILI BSC -C: Performed by: SPECIALIST

## 2019-01-24 PROCEDURE — 74011250636 HC RX REV CODE- 250/636: Performed by: FAMILY MEDICINE

## 2019-01-24 PROCEDURE — 77030009038 HC CATH BILI STN RTVR BSC -C: Performed by: SPECIALIST

## 2019-01-24 PROCEDURE — 74011250636 HC RX REV CODE- 250/636

## 2019-01-24 PROCEDURE — 77030018846 HC SOL IRR STRL H20 ICUM -A: Performed by: SPECIALIST

## 2019-01-24 PROCEDURE — BF141ZZ FLUOROSCOPY OF GALLBLADDER, BILE DUCTS AND PANCREATIC DUCTS USING LOW OSMOLAR CONTRAST: ICD-10-PCS | Performed by: SPECIALIST

## 2019-01-24 PROCEDURE — 77030013992 HC SNR POLYP ENDOSC BSC -B: Performed by: SPECIALIST

## 2019-01-24 PROCEDURE — 77030009426 HC FCPS BIOP ENDOSC BSC -B: Performed by: SPECIALIST

## 2019-01-24 PROCEDURE — 77030008684 HC TU ET CUF COVD -B: Performed by: ANESTHESIOLOGY

## 2019-01-24 PROCEDURE — 65270000029 HC RM PRIVATE

## 2019-01-24 PROCEDURE — C1769 GUIDE WIRE: HCPCS | Performed by: SPECIALIST

## 2019-01-24 PROCEDURE — 74011250637 HC RX REV CODE- 250/637: Performed by: INTERNAL MEDICINE

## 2019-01-24 PROCEDURE — 0FC98ZZ EXTIRPATION OF MATTER FROM COMMON BILE DUCT, VIA NATURAL OR ARTIFICIAL OPENING ENDOSCOPIC: ICD-10-PCS | Performed by: SPECIALIST

## 2019-01-24 PROCEDURE — 77030007288 HC DEV LOK BILI BSC -A: Performed by: SPECIALIST

## 2019-01-24 PROCEDURE — 82962 GLUCOSE BLOOD TEST: CPT

## 2019-01-24 PROCEDURE — 80053 COMPREHEN METABOLIC PANEL: CPT

## 2019-01-24 RX ORDER — SUCCINYLCHOLINE CHLORIDE 20 MG/ML
INJECTION INTRAMUSCULAR; INTRAVENOUS AS NEEDED
Status: DISCONTINUED | OUTPATIENT
Start: 2019-01-24 | End: 2019-01-24 | Stop reason: HOSPADM

## 2019-01-24 RX ORDER — FENTANYL CITRATE 50 UG/ML
INJECTION, SOLUTION INTRAMUSCULAR; INTRAVENOUS AS NEEDED
Status: DISCONTINUED | OUTPATIENT
Start: 2019-01-24 | End: 2019-01-24 | Stop reason: HOSPADM

## 2019-01-24 RX ORDER — SODIUM CHLORIDE 9 MG/ML
50 INJECTION, SOLUTION INTRAVENOUS CONTINUOUS
Status: DISPENSED | OUTPATIENT
Start: 2019-01-24 | End: 2019-01-24

## 2019-01-24 RX ORDER — DEXTROMETHORPHAN/PSEUDOEPHED 2.5-7.5/.8
1.2 DROPS ORAL
Status: DISCONTINUED | OUTPATIENT
Start: 2019-01-24 | End: 2019-01-24 | Stop reason: HOSPADM

## 2019-01-24 RX ORDER — NALOXONE HYDROCHLORIDE 0.4 MG/ML
0.4 INJECTION, SOLUTION INTRAMUSCULAR; INTRAVENOUS; SUBCUTANEOUS
Status: DISCONTINUED | OUTPATIENT
Start: 2019-01-24 | End: 2019-01-24 | Stop reason: HOSPADM

## 2019-01-24 RX ORDER — MIDAZOLAM HYDROCHLORIDE 1 MG/ML
.25-5 INJECTION, SOLUTION INTRAMUSCULAR; INTRAVENOUS AS NEEDED
Status: DISCONTINUED | OUTPATIENT
Start: 2019-01-24 | End: 2019-01-24 | Stop reason: HOSPADM

## 2019-01-24 RX ORDER — PROPOFOL 10 MG/ML
INJECTION, EMULSION INTRAVENOUS
Status: DISCONTINUED | OUTPATIENT
Start: 2019-01-24 | End: 2019-01-24 | Stop reason: HOSPADM

## 2019-01-24 RX ORDER — PROPOFOL 10 MG/ML
INJECTION, EMULSION INTRAVENOUS AS NEEDED
Status: DISCONTINUED | OUTPATIENT
Start: 2019-01-24 | End: 2019-01-24 | Stop reason: HOSPADM

## 2019-01-24 RX ORDER — FLUMAZENIL 0.1 MG/ML
0.2 INJECTION INTRAVENOUS
Status: DISCONTINUED | OUTPATIENT
Start: 2019-01-24 | End: 2019-01-24 | Stop reason: HOSPADM

## 2019-01-24 RX ORDER — FENTANYL CITRATE 50 UG/ML
25 INJECTION, SOLUTION INTRAMUSCULAR; INTRAVENOUS AS NEEDED
Status: DISCONTINUED | OUTPATIENT
Start: 2019-01-24 | End: 2019-01-24 | Stop reason: HOSPADM

## 2019-01-24 RX ORDER — LIDOCAINE HYDROCHLORIDE 20 MG/ML
INJECTION, SOLUTION EPIDURAL; INFILTRATION; INTRACAUDAL; PERINEURAL AS NEEDED
Status: DISCONTINUED | OUTPATIENT
Start: 2019-01-24 | End: 2019-01-24 | Stop reason: HOSPADM

## 2019-01-24 RX ORDER — ROCURONIUM BROMIDE 10 MG/ML
INJECTION, SOLUTION INTRAVENOUS AS NEEDED
Status: DISCONTINUED | OUTPATIENT
Start: 2019-01-24 | End: 2019-01-24 | Stop reason: HOSPADM

## 2019-01-24 RX ORDER — ATROPINE SULFATE 0.1 MG/ML
0.5 INJECTION INTRAVENOUS
Status: DISCONTINUED | OUTPATIENT
Start: 2019-01-24 | End: 2019-01-24 | Stop reason: HOSPADM

## 2019-01-24 RX ORDER — EPINEPHRINE 0.1 MG/ML
1 INJECTION INTRACARDIAC; INTRAVENOUS
Status: DISCONTINUED | OUTPATIENT
Start: 2019-01-24 | End: 2019-01-24 | Stop reason: HOSPADM

## 2019-01-24 RX ADMIN — PANTOPRAZOLE SODIUM 40 MG: 40 TABLET, DELAYED RELEASE ORAL at 08:10

## 2019-01-24 RX ADMIN — ROCURONIUM BROMIDE 5 MG: 10 INJECTION, SOLUTION INTRAVENOUS at 12:34

## 2019-01-24 RX ADMIN — IOPAMIDOL 10 ML: 612 INJECTION, SOLUTION INTRAVENOUS at 13:01

## 2019-01-24 RX ADMIN — DEXTROSE MONOHYDRATE, SODIUM CHLORIDE, AND POTASSIUM CHLORIDE 125 ML/HR: 50; 4.5; 1.49 INJECTION, SOLUTION INTRAVENOUS at 04:14

## 2019-01-24 RX ADMIN — ACETAMINOPHEN 650 MG: 325 TABLET ORAL at 07:29

## 2019-01-24 RX ADMIN — LIDOCAINE HYDROCHLORIDE 50 MG: 20 INJECTION, SOLUTION EPIDURAL; INFILTRATION; INTRACAUDAL; PERINEURAL at 12:34

## 2019-01-24 RX ADMIN — PROPOFOL 175 MCG/KG/MIN: 10 INJECTION, EMULSION INTRAVENOUS at 12:34

## 2019-01-24 RX ADMIN — ACETAMINOPHEN 650 MG: 325 TABLET ORAL at 22:45

## 2019-01-24 RX ADMIN — TIMOLOL MALEATE 1 DROP: 5 SOLUTION/ DROPS OPHTHALMIC at 08:10

## 2019-01-24 RX ADMIN — SODIUM CHLORIDE: 9 INJECTION, SOLUTION INTRAVENOUS at 12:00

## 2019-01-24 RX ADMIN — ACETAMINOPHEN 650 MG: 325 TABLET ORAL at 13:57

## 2019-01-24 RX ADMIN — DEXTROSE MONOHYDRATE, SODIUM CHLORIDE, AND POTASSIUM CHLORIDE 75 ML/HR: 50; 4.5; 1.49 INJECTION, SOLUTION INTRAVENOUS at 18:12

## 2019-01-24 RX ADMIN — ACETAMINOPHEN 650 MG: 325 TABLET ORAL at 18:12

## 2019-01-24 RX ADMIN — SUCCINYLCHOLINE CHLORIDE 100 MG: 20 INJECTION INTRAMUSCULAR; INTRAVENOUS at 12:34

## 2019-01-24 RX ADMIN — PROPOFOL 150 MG: 10 INJECTION, EMULSION INTRAVENOUS at 12:34

## 2019-01-24 RX ADMIN — TIMOLOL MALEATE 1 DROP: 5 SOLUTION/ DROPS OPHTHALMIC at 18:13

## 2019-01-24 RX ADMIN — FENTANYL CITRATE 50 MCG: 50 INJECTION, SOLUTION INTRAMUSCULAR; INTRAVENOUS at 12:34

## 2019-01-24 NOTE — DIABETES MGMT
DTC Consult Note    Recommendations/ Comments: Spoke with pt. She reports she has been told in the past that her blood sugar was elevated but denies that she has diabetes. Explained the A1c, what it means, what hers is and the target for the A1c. Provided her with an Accu Chek Guide meter and showed her how to use it. She gave a return demo and blood sugar was 121 mg/dl. Reviewed importance of good glycemic control post-op to reduce risk of infection and promote healing. Reviewed appropriate blood sugar goals and when to contact MD. Pt states she usually eats 2-3 meals per day and drinks water, coffee and occasionally a regular soda. Discussed with patient the impact of sugary beverages on blood sugars and discussed healthier beverages for her to try. Explained the importance of 3 meals per day and not skipping meals. Reviewed (handout provided) the plate method to help plan balance meals with proper portions. We discussed which foods impact blood sugars and which do not. Discussed follow up out patient education but pt declined at this time stating she wished to follow up with her PCP as she was not sure that she actually has diabetes. Our out patient contact information was provided. At d/c pt will need a prescription for Accu Chek Guide meter strips and Lancets. Current hospital DM medication: correction scale Humalog with high sensitivity. Consult received for:      [x]             New diagnosis      Chart reviewed and initial evaluation complete on [place]. Patient is a 77 y.o. female with new diagnosis of diabetes. BG monitoring at home - pt provided with an Accu Chek Guide meter and shown how to use it. Pt gave a return demo and blood sugar was 121 mg/dl. Reviewed importance of good glycemic control post-op to reduce risk of infection and promote healing.  Reviewed appropriate blood sugar goals and when to contact MD.   Assessed and instructed patient on the following:   · interpretation of lab results, blood sugar goals, hypoglycemia prevention and treatment, exercise, SMBG skills, nutrition, referred to Diabetes Educator and importance of glycemic control post-op to reduce risk of infection and promote healing. Encouraged the following:   · increased exercise- as appropriate  · dietary modifications: 3 meals per day, do not skip meals. No sugary beverages. Use plate method reviewed for meal planning and portion control   ·  regular blood sugar monitorin times daily    Provided patient with the following: [x]             Survival skills education materials               [x]             CHO counting education materials               [x]             Outpatient DTC contact number               [x]             Glucometer               Patient was able to give return demonstration of    [x]       Glucometer     Discussed with patient and/or family need for follow up appointment for diabetes management after discharge. A1c:   Lab Results   Component Value Date/Time    Hemoglobin A1c 7.0 (H) 2019 07:41 AM       Recent Glucose Results:   Lab Results   Component Value Date/Time     (H) 2019 07:37 AM    GLUCPOC 153 (H) 2019 06:17 AM    GLUCPOC 134 (H) 2019 01:07 AM    GLUCPOC 140 (H) 2019 05:24 PM        Lab Results   Component Value Date/Time    Creatinine 0.92 2019 07:37 AM     Estimated Creatinine Clearance: 46.7 mL/min (based on SCr of 0.92 mg/dL). Active Orders   Diet    DIET NPO    DIET NPO        PO intake: No data found. Will continue to follow as needed. Thank you.     Kassandra Moon RD, CDE      Time spent: 35 minutes

## 2019-01-24 NOTE — ROUTINE PROCESS
Thelma Polk  1940  [id number]    Situation:  Verbal report received from: Ryne Ellsworth RN  Procedure: Procedure(s):  ENDOSCOPIC RETROGRADE CHOLANGIOPANCREATOGRAPHY (ERCP)  SPHINCTEROTOMY  ENDOSCOPIC STONE EXTRACTION/BALLOON SWEEP    Background:    Preoperative diagnosis: CBD Stone  Postoperative diagnosis: * No post-op diagnosis entered *    :  Dr. Teresa Jeffers  Assistant(s): Endoscopy Technician-1: Merline Lake  Endoscopy RN-1: Chip Hall RN    Specimens: * No specimens in log *  H. Pylori  no    Assessment:  Intra-procedure medications     Anesthesia gave intra-procedure sedation and medications, see anesthesia flow sheet yes    Intravenous fluids: NS@ KVO     Vital signs stable     Abdominal assessment: round and soft     Recommendation:  Discharge patient per MD order.   Return to floor  Permission to share finding with family or friend yes

## 2019-01-24 NOTE — PERIOP NOTES
TRANSFER - OUT REPORT:    Verbal report given to Paris Helms  being transferred to 5th Floor  for routine progression of care       Report consisted of patients Situation, Background, Assessment and   Recommendations(SBAR). Information from the following report(s) SBAR, Recent Results and Cardiac Rhythm NSR was reviewed with the receiving nurse. Lines:   Peripheral IV 01/23/19 Right; Lower Arm (Active)   Site Assessment Clean, dry, & intact 1/24/2019  8:10 AM   Phlebitis Assessment 0 1/24/2019  8:10 AM   Infiltration Assessment 0 1/24/2019  8:10 AM   Dressing Status Clean, dry, & intact 1/24/2019  8:10 AM   Dressing Type Transparent 1/24/2019  8:10 AM   Hub Color/Line Status Pink; Infusing 1/24/2019  8:10 AM   Action Taken Open ports on tubing capped 1/24/2019  2:40 AM   Alcohol Cap Used Yes 1/24/2019  2:40 AM        Opportunity for questions and clarification was provided.       Patient transported with:   [employer]

## 2019-01-24 NOTE — ANESTHESIA PREPROCEDURE EVALUATION
Anesthetic History               Review of Systems / Medical History  Patient summary reviewed    Pulmonary  Within defined limits                 Neuro/Psych             Comments: Glaucoma Cardiovascular    Hypertension          Hyperlipidemia         GI/Hepatic/Renal     GERD: well controlled           Endo/Other    Diabetes    Anemia    Comments: Unsure why anemic  Elevated BS Other Findings              Physical Exam    Airway  Mallampati: III    Neck ROM: normal range of motion   Mouth opening: Normal     Cardiovascular    Rhythm: regular  Rate: normal         Dental         Pulmonary  Breath sounds clear to auscultation               Abdominal         Other Findings            Anesthetic Plan    ASA: 2  Anesthesia type: general            Anesthetic plan and risks discussed with: Patient      Informed consent obtained.

## 2019-01-24 NOTE — ANESTHESIA POSTPROCEDURE EVALUATION
Procedure(s):  ENDOSCOPIC RETROGRADE CHOLANGIOPANCREATOGRAPHY (ERCP)  SPHINCTEROTOMY  ENDOSCOPIC STONE EXTRACTION/BALLOON SWEEP. Anesthesia Post Evaluation      Multimodal analgesia: multimodal analgesia not used between 6 hours prior to anesthesia start to PACU discharge  Patient location during evaluation: PACU  Patient participation: complete - patient participated  Level of consciousness: awake and alert  Pain score: 0  Pain management: adequate  Airway patency: patent  Anesthetic complications: no  Cardiovascular status: hemodynamically stable and acceptable  Respiratory status: acceptable  Hydration status: acceptable  Comments: Patient seen and evaluated; swollen left upper lip.   Post anesthesia nausea and vomiting:  none      Visit Vitals  /65   Pulse 61   Temp 36.9 °C (98.5 °F)   Resp 17   Ht 5' 1\" (1.549 m)   Wt 77.6 kg (171 lb)   SpO2 97%   BMI 32.31 kg/m²

## 2019-01-24 NOTE — PROGRESS NOTES
TRANSFER - OUT REPORT:    Verbal report given to Shannon Monterroso RN(name) on Doyne Bamberger  being transferred to [facility](unit) for routine progression of care       Report consisted of patients Situation, Background, Assessment and   Recommendations(SBAR). Information from the following report(s) SBAR, Kardex and MAR was reviewed with the receiving nurse. Lines:   Peripheral IV 01/23/19 Right Antecubital (Active)   Site Assessment Clean, dry, & intact 1/24/2019  8:10 AM   Phlebitis Assessment 0 1/24/2019  8:10 AM   Infiltration Assessment 0 1/24/2019  8:10 AM   Dressing Status Clean, dry, & intact 1/24/2019  8:10 AM   Dressing Type Transparent 1/24/2019  8:10 AM   Hub Color/Line Status Pink;Capped 1/24/2019  8:10 AM   Action Taken Open ports on tubing capped 1/24/2019  2:40 AM   Alcohol Cap Used Yes 1/24/2019  2:40 AM       Peripheral IV 01/23/19 Right; Lower Arm (Active)   Site Assessment Clean, dry, & intact 1/24/2019  8:10 AM   Phlebitis Assessment 0 1/24/2019  8:10 AM   Infiltration Assessment 0 1/24/2019  8:10 AM   Dressing Status Clean, dry, & intact 1/24/2019  8:10 AM   Dressing Type Transparent 1/24/2019  8:10 AM   Hub Color/Line Status Pink; Infusing 1/24/2019  8:10 AM   Action Taken Open ports on tubing capped 1/24/2019  2:40 AM   Alcohol Cap Used Yes 1/24/2019  2:40 AM        Opportunity for questions and clarification was provided.

## 2019-01-24 NOTE — PERIOP NOTES
Adiel Jacques  1940  [id number]    Situation:    Scheduled Procedure: Procedure(s):  ENDOSCOPIC RETROGRADE CHOLANGIOPANCREATOGRAPHY (ERCP)  Verbal report received from: Yazan Charles RN  Preoperative diagnosis: CBD Stone    Background:    Procedure: Procedure(s):  ENDOSCOPIC RETROGRADE CHOLANGIOPANCREATOGRAPHY (ERCP)  Physician performing procedure; Dr. Jcarlos Mcghee MD , Dr. Deanne Torres PO Intake: NPO after midnight    Pregnancy Test:No If yes, result: none    Is the patient taking Blood Thinners: YES If yes, list: Lovenox and last taken 1/24/19  Is the patient diabetic:yes       If yes, what was the last BS:  0600    Anything given? no           Does the patient have a Pacemaker/Defibrillator in place?: no   Does the patient need antibiotics before/during/after procedure: no   Is patient on CONTACT precautions:no           Assessment:  Are the vital signs stable prior to patient coming to ENDO?  yes  Is the patient alert/oriented and able to sign consent for the procedures:yes  How does the patient's abdomen feel prior to coming to ENDO? round and soft no   Does the patient have a patient IV in place?  yes    Recommendation:  Family or Friend present no     Permission to share finding with Family or Friend yes

## 2019-01-24 NOTE — PROGRESS NOTES
General Surgery Daily Progress Note    Patient: José Manuel Watt MRN: [id number]  SSN: [id number]    YOB: 1940  Age: 77 y.o. Sex: female      Admit Date: 1/23/2019    Subjective:   Pain resolved. Denies N/V. Current Facility-Administered Medications   Medication Dose Route Frequency    dextrose 5% - 0.45% NaCl with KCl 20 mEq/L infusion  75 mL/hr IntraVENous CONTINUOUS    pantoprazole (PROTONIX) tablet 40 mg  40 mg Oral DAILY    naloxone (NARCAN) injection 0.4 mg  0.4 mg IntraVENous PRN    glucose chewable tablet 16 g  4 Tab Oral PRN    dextrose (D50W) injection syrg 12.5-25 g  25-50 mL IntraVENous PRN    glucagon (GLUCAGEN) injection 1 mg  1 mg IntraMUSCular PRN    acetaminophen (TYLENOL) tablet 650 mg  650 mg Oral Q4H PRN    HYDROmorphone (PF) (DILAUDID) injection 0.5 mg  0.5 mg IntraVENous Q4H PRN    diphenhydrAMINE (BENADRYL) capsule 25 mg  25 mg Oral Q4H PRN    ondansetron (ZOFRAN) injection 4 mg  4 mg IntraVENous Q4H PRN    enoxaparin (LOVENOX) injection 40 mg  40 mg SubCUTAneous Q24H    insulin lispro (HUMALOG) injection   SubCUTAneous Q6H    timolol (TIMOPTIC) 0.5 % ophthalmic solution 1 Drop  1 Drop Both Eyes BID        Objective:   01/24 0701 - 01/24 1900  In: 2356.7 [I.V.:2356.7]  Out: -   01/22 1901 - 01/24 0700  In: 118.8 [I.V.:118.8]  Out: -   Patient Vitals for the past 8 hrs:   BP Temp Pulse Resp SpO2   01/24/19 0758 127/60 98 °F (36.7 °C) 63 18 94 %   01/24/19 0453 110/62 98.9 °F (37.2 °C) 74 18 96 %       Physical Exam:  General: Alert, cooperative, NAD  Lungs: Unlabored  Heart:  Regular rate and  rhythm  Abdomen: Soft, non-tender, non-distended.   Extremities: Warm, moves all, no edema  Skin:  Warm and dry, no rash    Labs:   Recent Labs     01/24/19  0737   WBC 8.2   HGB 9.8*   HCT 31.9*        Recent Labs     01/24/19  0737      K 4.3      CO2 26   *   BUN 13   CREA 0.92   CA 8.0*   ALB 2.7*   TBILI 0.4   SGOT 110*   * Assessment / Plan:   · Gallstone pancreatitis  · Pain resolved  · Bilirubin normalized and transaminases trending down. ERCP planned by GI. · Plan for lap jamarcus tomorrow. NPO after midnight.

## 2019-01-24 NOTE — PROGRESS NOTES
Daily Progress Note: 1/24/2019  Pedro Pablo Dallas MD    Assessment/Plan:   Pancreatitis / Leukocytosis / LFT elevation / Abdominal pain / Nausea & vomiting / hyperbilirubinemia - POA, presumed gallstone pancreatitis. -- Surgery consulted   -- ERCP scheduled for today. -- NPO.   -- IVF. -- Pain control     Elevated blood glucose - POA, no hx of DM. -- A1c 7.0  -- Will ask diabetes ed to see     Dehydration - POA due to anorexia and vomiting.   --  Hydrate and follow labs.     Hypertension -   -- Holding lisinopril and Dyazide     -- Hold ASA anticipating ERCP.     Hyperlipidemia -   -- Holding atorvastatin in setting of LFTs and NPO     Glaucoma - continue timolol.     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 ([facility] ICD-10-CM: E11.29  ICD-9-CM: 250.40  Unknown - Present        Dehydration ICD-10-CM: E86.0  ICD-9-CM: 276.51  1/23/2019 - Present        LFT elevation ICD-10-CM: R94.5  ICD-9-CM: 790.6  1/23/2019 - Present        Abdominal pain ICD-10-CM: R10.9  ICD-9-CM: 789.00  1/23/2019 - Present        Nausea & vomiting ICD-10-CM: R11.2  ICD-9-CM: 787.01  1/23/2019 - Present        GERD (gastroesophageal reflux disease) ICD-10-CM: K21.9  ICD-9-CM: 530.81  Unknown - Present        Glaucoma ICD-10-CM: H40.9  ICD-9-CM: 365.9  Unknown - Present        HTN (hypertension) ICD-10-CM: I10  ICD-9-CM: 401.9  Unknown - Present        Hyperlipidemia ICD-10-CM: E78.5  ICD-9-CM: 272.4  Unknown - Present              HPI:   Ms. Carlos Triana is a 77 y.o.  female who presented to the Emergency Department complaining of abd pain. Sharp, mid upper gastric, radiating to back.   Today associated with nausea and vomiting. Started 2 hours after chicken and candy bar lunch yesterday. Similar milder symptoms twice in last 2 months, both times about an hour after eating. ER workup shows pancreatitis, and US suggests gallstones. We will admit her for management. (Dr Daisy Yanez)    : She is feeling better today except for a headache. No further nausea or vomiting. Less pain. Has been seen by GI and Surg. Plan for ERCP this afternoon and GB removal when pancreatitis is resolved. She is having some swelling in her right hand so will decrease fluids. AM labs pending. Review of Systems:   A comprehensive review of systems was negative except for that written in the HPI. Objective:   Physical Exam:     Visit Vitals  /62 (BP 1 Location: Right arm, BP Patient Position: At rest)   Pulse 74   Temp 98.9 °F (37.2 °C)   Resp 18   Ht 5' 1\" (1.549 m)   Wt 171 lb (77.6 kg)   SpO2 96%   BMI 32.31 kg/m²      O2 Device: Room air    Temp (24hrs), Av.7 °F (37.1 °C), Min:98.1 °F (36.7 °C), Max:99.2 °F (37.3 °C)    No intake/output data recorded.  1901 -  0700  In: 118.8 [I.V.:118.8]  Out: -     General:  Alert, cooperative, no distress, appears stated age. Head:  Normocephalic, without obvious abnormality, atraumatic. Eyes:  Conjunctivae/corneas clear. PERRL, EOMs intact. Nose: Nares normal. Septum midline. Mucosa normal. No drainage or sinus tenderness. Throat: Lips, mucosa, and tongue normal. Teeth and gums normal.   Neck: Supple, symmetrical, trachea midline, no adenopathy, thyroid: no enlargement/tenderness/nodules, no carotid bruit and no JVD. Back:   Symmetric, no curvature. ROM normal. No CVA tenderness. Lungs:   Clear to auscultation bilaterally. Chest wall:  No tenderness or deformity. Heart:  Regular rate and rhythm, S1, S2 normal, no murmur, click, rub or gallop. Abdomen:   Soft, mildly tender upper abd. Bowel sounds normal. No masses,  No organomegaly.    Extremities: Extremities normal, atraumatic, no cyanosis or edema. No calf tenderness or cords. Pulses: 2+ and symmetric all extremities. Skin: Skin color, texture, turgor normal. No rashes or lesions   Neurologic: CNII-XII intact. Alert and oriented X 3. Fine motor of hands and fingers normal.   equal.  No cogwheeling or rigidity. Gait not tested at this time. Sensation grossly normal to touch. Gross motor of extremities normal.       Data Review:       Recent Days:  Recent Labs     01/23/19  0741   WBC 14.9*   HGB 12.3   HCT 39.2        Recent Labs     01/23/19  0742 01/23/19  0741   NA  --  141   K  --  3.7   CL  --  101   CO2  --  27   GLU  --  161*   BUN  --  24*   CREA  --  1.06*   CA  --  9.4   ALB  --  3.7   TBILI  --  2.1*   SGOT  --  354*   ALT  --  192*   INR 1.0  --      No results for input(s): PH, PCO2, PO2, HCO3, FIO2 in the last 72 hours. 24 Hour Results:  Recent Results (from the past 24 hour(s))   SAMPLES BEING HELD    Collection Time: 01/23/19  7:41 AM   Result Value Ref Range    SAMPLES BEING HELD sst     COMMENT        Add-on orders for these samples will be processed based on acceptable specimen integrity and analyte stability, which may vary by analyte. CBC WITH AUTOMATED DIFF    Collection Time: 01/23/19  7:41 AM   Result Value Ref Range    WBC 14.9 (H) 3.6 - 11.0 K/uL    RBC 4.42 3.80 - 5.20 M/uL    HGB 12.3 11.5 - 16.0 g/dL    HCT 39.2 35.0 - 47.0 %    MCV 88.7 80.0 - 99.0 FL    MCH 27.8 26.0 - 34.0 PG    MCHC 31.4 30.0 - 36.5 g/dL    RDW 13.2 11.5 - 14.5 %    PLATELET 340 187 - 770 K/uL    MPV 10.0 8.9 - 12.9 FL    NRBC 0.0 0  WBC    ABSOLUTE NRBC 0.00 0.00 - 0.01 K/uL    NEUTROPHILS 77 (H) 32 - 75 %    LYMPHOCYTES 15 12 - 49 %    MONOCYTES 7 5 - 13 %    EOSINOPHILS 0 0 - 7 %    BASOPHILS 1 0 - 1 %    IMMATURE GRANULOCYTES 0 0.0 - 0.5 %    ABS. NEUTROPHILS 11.5 (H) 1.8 - 8.0 K/UL    ABS. LYMPHOCYTES 2.2 0.8 - 3.5 K/UL    ABS. MONOCYTES 1.1 (H) 0.0 - 1.0 K/UL    ABS.  EOSINOPHILS 0.0 0.0 - 0.4 K/UL    ABS. BASOPHILS 0.1 0.0 - 0.1 K/UL    ABS. IMM. GRANS. 0.1 (H) 0.00 - 0.04 K/UL    DF AUTOMATED     LIPASE    Collection Time: 01/23/19  7:41 AM   Result Value Ref Range    Lipase >3,000 (H) 73 - 208 U/L   METABOLIC PANEL, COMPREHENSIVE    Collection Time: 01/23/19  7:41 AM   Result Value Ref Range    Sodium 141 136 - 145 mmol/L    Potassium 3.7 3.5 - 5.1 mmol/L    Chloride 101 97 - 108 mmol/L    CO2 27 21 - 32 mmol/L    Anion gap 13 5 - 15 mmol/L    Glucose 161 (H) 65 - 100 mg/dL    BUN 24 (H) 6 - 20 MG/DL    Creatinine 1.06 (H) 0.55 - 1.02 MG/DL    BUN/Creatinine ratio 23 (H) 12 - 20      GFR est AA >60 >60 ml/min/1.73m2    GFR est non-AA 50 (L) >60 ml/min/1.73m2    Calcium 9.4 8.5 - 10.1 MG/DL    Bilirubin, total 2.1 (H) 0.2 - 1.0 MG/DL    ALT (SGPT) 192 (H) 12 - 78 U/L    AST (SGOT) 354 (H) 15 - 37 U/L    Alk.  phosphatase 181 (H) 45 - 117 U/L    Protein, total 7.5 6.4 - 8.2 g/dL    Albumin 3.7 3.5 - 5.0 g/dL    Globulin 3.8 2.0 - 4.0 g/dL    A-G Ratio 1.0 (L) 1.1 - 2.2     TROPONIN I    Collection Time: 01/23/19  7:41 AM   Result Value Ref Range    Troponin-I, Qt. <0.05 <0.05 ng/mL   HEMOGLOBIN A1C WITH EAG    Collection Time: 01/23/19  7:41 AM   Result Value Ref Range    Hemoglobin A1c 7.0 (H) 4.2 - 6.3 %    Est. average glucose 154 mg/dL   LIPID PANEL    Collection Time: 01/23/19  7:41 AM   Result Value Ref Range    LIPID PROFILE          Cholesterol, total 137 <200 MG/DL    Triglyceride 164 (H) <150 MG/DL    HDL Cholesterol 54 MG/DL    LDL, calculated 50.2 0 - 100 MG/DL    VLDL, calculated 32.8 MG/DL    CHOL/HDL Ratio 2.5 0 - 5.0     URINALYSIS W/ REFLEX CULTURE    Collection Time: 01/23/19  7:42 AM   Result Value Ref Range    Color DARK YELLOW      Appearance CLEAR CLEAR      Specific gravity 1.019 1.003 - 1.030      pH (UA) 5.0 5.0 - 8.0      Protein TRACE (A) NEG mg/dL    Glucose NEGATIVE  NEG mg/dL    Ketone NEGATIVE  NEG mg/dL    Blood NEGATIVE  NEG      Urobilinogen 1.0 0.2 - 1.0 EU/dL    Nitrites NEGATIVE  NEG      Leukocyte Esterase SMALL (A) NEG      WBC 0-4 0 - 4 /hpf    RBC 0-5 0 - 5 /hpf    Epithelial cells FEW FEW /lpf    Bacteria NEGATIVE  NEG /hpf    UA:UC IF INDICATED CULTURE NOT INDICATED BY UA RESULT CNI      Hyaline cast 0-2 0 - 5 /lpf   PTT    Collection Time: 01/23/19  7:42 AM   Result Value Ref Range    aPTT 21.2 (L) 22.1 - 32.0 sec    aPTT, therapeutic range     58.0 - 77.0 SECS   PROTHROMBIN TIME + INR    Collection Time: 01/23/19  7:42 AM   Result Value Ref Range    INR 1.0 0.9 - 1.1      Prothrombin time 10.4 9.0 - 11.1 sec   BILIRUBIN, CONFIRM    Collection Time: 01/23/19  7:42 AM   Result Value Ref Range    Bilirubin UA, confirm POSITIVE (A) NEG     EKG, 12 LEAD, INITIAL    Collection Time: 01/23/19  8:21 AM   Result Value Ref Range    Ventricular Rate 71 BPM    Atrial Rate 71 BPM    P-R Interval 142 ms    QRS Duration 88 ms    Q-T Interval 400 ms    QTC Calculation (Bezet) 434 ms    Calculated P Axis 144 degrees    Calculated R Axis -25 degrees    Calculated T Axis 150 degrees    Diagnosis       Unusual P axis, possible ectopic atrial rhythm with undetermined rhythm   irregularity  T wave abnormality, consider lateral ischemia  Abnormal ECG  No previous ECGs available  Confirmed by Emily Cano MD, CURT ([id number]) on 1/23/2019 12:11:37 PM     GLUCOSE, POC    Collection Time: 01/23/19 11:59 AM   Result Value Ref Range    Glucose (POC) 120 (H) 65 - 100 mg/dL    Performed by Demetrius Yates    GLUCOSE, POC    Collection Time: 01/23/19  5:24 PM   Result Value Ref Range    Glucose (POC) 140 (H) 65 - 100 mg/dL    Performed by Maribel Umana (PCT)    GLUCOSE, POC    Collection Time: 01/24/19  1:07 AM   Result Value Ref Range    Glucose (POC) 134 (H) 65 - 100 mg/dL    Performed by Radha Gutierrez Ei    GLUCOSE, POC    Collection Time: 01/24/19  6:17 AM   Result Value Ref Range    Glucose (POC) 153 (H) 65 - 100 mg/dL    Performed by Hiral White (PCT)        Medications reviewed  Current Facility-Administered Medications   Medication Dose Route Frequency    dextrose 5% - 0.45% NaCl with KCl 20 mEq/L infusion  125 mL/hr IntraVENous CONTINUOUS    pantoprazole (PROTONIX) tablet 40 mg  40 mg Oral DAILY    naloxone (NARCAN) injection 0.4 mg  0.4 mg IntraVENous PRN    glucose chewable tablet 16 g  4 Tab Oral PRN    dextrose (D50W) injection syrg 12.5-25 g  25-50 mL IntraVENous PRN    glucagon (GLUCAGEN) injection 1 mg  1 mg IntraMUSCular PRN    acetaminophen (TYLENOL) tablet 650 mg  650 mg Oral Q4H PRN    HYDROmorphone (PF) (DILAUDID) injection 0.5 mg  0.5 mg IntraVENous Q4H PRN    diphenhydrAMINE (BENADRYL) capsule 25 mg  25 mg Oral Q4H PRN    ondansetron (ZOFRAN) injection 4 mg  4 mg IntraVENous Q4H PRN    enoxaparin (LOVENOX) injection 40 mg  40 mg SubCUTAneous Q24H    insulin lispro (HUMALOG) injection   SubCUTAneous Q6H    timolol (TIMOPTIC) 0.5 % ophthalmic solution 1 Drop  1 Drop Both Eyes BID       Care Plan discussed with: Patient/Family    Total time spent with patient and review of records: 30 minutes.     Brianne Driscoll MD

## 2019-01-24 NOTE — PROGRESS NOTES
Patient visited by [facility] Partner Volunteer in Medical Surgical Unit on 1/24/2019. Rev.  Shiela Adams, [address] paging service: [phone]

## 2019-01-24 NOTE — PROGRESS NOTES
Bedside and Verbal shift change report given to Daphne Ashraf RN (oncoming nurse) by Zehra Sinclair RN (offgoing nurse). Report included the following information SBAR, Kardex, Intake/Output, MAR, Recent Results and Med Rec Status.

## 2019-01-24 NOTE — PROGRESS NOTES
Bedside shift change report given to Alfredo Robert RN (oncoming nurse) by Komal Vincent RN (offgoing nurse). Report included the following information SBAR, Kardex and MAR.

## 2019-01-24 NOTE — PROGRESS NOTES
Bedside and Verbal shift change report given to Lauren rn  (oncoming nurse) by Diandra Pinzon  (offgoing nurse). Report included the following information SBAR and Kardex.

## 2019-01-25 ENCOUNTER — ANESTHESIA EVENT (OUTPATIENT)
Dept: SURGERY | Age: 79
DRG: 417 | End: 2019-01-25
Payer: MEDICARE

## 2019-01-25 ENCOUNTER — ANESTHESIA (OUTPATIENT)
Dept: SURGERY | Age: 79
DRG: 417 | End: 2019-01-25
Payer: MEDICARE

## 2019-01-25 LAB
ALBUMIN SERPL-MCNC: 2.5 G/DL (ref 3.5–5)
ALBUMIN/GLOB SERPL: 0.7 {RATIO} (ref 1.1–2.2)
ALP SERPL-CCNC: 136 U/L (ref 45–117)
ALT SERPL-CCNC: 91 U/L (ref 12–78)
ANION GAP SERPL CALC-SCNC: 7 MMOL/L (ref 5–15)
AST SERPL-CCNC: 54 U/L (ref 15–37)
BASOPHILS # BLD: 0 K/UL (ref 0–0.1)
BASOPHILS NFR BLD: 0 % (ref 0–1)
BILIRUB SERPL-MCNC: 0.4 MG/DL (ref 0.2–1)
BUN SERPL-MCNC: 12 MG/DL (ref 6–20)
BUN/CREAT SERPL: 13 (ref 12–20)
CALCIUM SERPL-MCNC: 8.3 MG/DL (ref 8.5–10.1)
CHLORIDE SERPL-SCNC: 108 MMOL/L (ref 97–108)
CO2 SERPL-SCNC: 27 MMOL/L (ref 21–32)
CREAT SERPL-MCNC: 0.9 MG/DL (ref 0.55–1.02)
DIFFERENTIAL METHOD BLD: ABNORMAL
EOSINOPHIL # BLD: 0.2 K/UL (ref 0–0.4)
EOSINOPHIL NFR BLD: 2 % (ref 0–7)
ERYTHROCYTE [DISTWIDTH] IN BLOOD BY AUTOMATED COUNT: 13.2 % (ref 11.5–14.5)
GLOBULIN SER CALC-MCNC: 3.4 G/DL (ref 2–4)
GLUCOSE BLD STRIP.AUTO-MCNC: 127 MG/DL (ref 65–100)
GLUCOSE BLD STRIP.AUTO-MCNC: 131 MG/DL (ref 65–100)
GLUCOSE BLD STRIP.AUTO-MCNC: 135 MG/DL (ref 65–100)
GLUCOSE BLD STRIP.AUTO-MCNC: 189 MG/DL (ref 65–100)
GLUCOSE BLD STRIP.AUTO-MCNC: 220 MG/DL (ref 65–100)
GLUCOSE SERPL-MCNC: 121 MG/DL (ref 65–100)
HCT VFR BLD AUTO: 32.9 % (ref 35–47)
HGB BLD-MCNC: 9.7 G/DL (ref 11.5–16)
IMM GRANULOCYTES # BLD AUTO: 0 K/UL (ref 0–0.04)
IMM GRANULOCYTES NFR BLD AUTO: 1 % (ref 0–0.5)
LIPASE SERPL-CCNC: 176 U/L (ref 73–393)
LYMPHOCYTES # BLD: 1.8 K/UL (ref 0.8–3.5)
LYMPHOCYTES NFR BLD: 22 % (ref 12–49)
MCH RBC QN AUTO: 26.8 PG (ref 26–34)
MCHC RBC AUTO-ENTMCNC: 29.5 G/DL (ref 30–36.5)
MCV RBC AUTO: 90.9 FL (ref 80–99)
MONOCYTES # BLD: 0.8 K/UL (ref 0–1)
MONOCYTES NFR BLD: 11 % (ref 5–13)
NEUTS SEG # BLD: 5.1 K/UL (ref 1.8–8)
NEUTS SEG NFR BLD: 64 % (ref 32–75)
NRBC # BLD: 0 K/UL (ref 0–0.01)
NRBC BLD-RTO: 0 PER 100 WBC
PLATELET # BLD AUTO: 190 K/UL (ref 150–400)
PMV BLD AUTO: 9.5 FL (ref 8.9–12.9)
POTASSIUM SERPL-SCNC: 4.2 MMOL/L (ref 3.5–5.1)
PROT SERPL-MCNC: 5.9 G/DL (ref 6.4–8.2)
RBC # BLD AUTO: 3.62 M/UL (ref 3.8–5.2)
SERVICE CMNT-IMP: ABNORMAL
SODIUM SERPL-SCNC: 142 MMOL/L (ref 136–145)
WBC # BLD AUTO: 7.9 K/UL (ref 3.6–11)

## 2019-01-25 PROCEDURE — 77030002966 HC SUT PDS J&J -A: Performed by: SURGERY

## 2019-01-25 PROCEDURE — 74011000250 HC RX REV CODE- 250

## 2019-01-25 PROCEDURE — 82962 GLUCOSE BLOOD TEST: CPT

## 2019-01-25 PROCEDURE — 74011250637 HC RX REV CODE- 250/637: Performed by: INTERNAL MEDICINE

## 2019-01-25 PROCEDURE — 74011636637 HC RX REV CODE- 636/637: Performed by: INTERNAL MEDICINE

## 2019-01-25 PROCEDURE — 77030002933 HC SUT MCRYL J&J -A: Performed by: SURGERY

## 2019-01-25 PROCEDURE — 77030011640 HC PAD GRND REM COVD -A: Performed by: SURGERY

## 2019-01-25 PROCEDURE — 83690 ASSAY OF LIPASE: CPT

## 2019-01-25 PROCEDURE — 88304 TISSUE EXAM BY PATHOLOGIST: CPT

## 2019-01-25 PROCEDURE — 77030035278 HC STPLR SEAL ENDOWR INTU -B: Performed by: SURGERY

## 2019-01-25 PROCEDURE — 74011250636 HC RX REV CODE- 250/636: Performed by: ANESTHESIOLOGY

## 2019-01-25 PROCEDURE — 77030020263 HC SOL INJ SOD CL0.9% LFCR 1000ML: Performed by: SURGERY

## 2019-01-25 PROCEDURE — 77030018836 HC SOL IRR NACL ICUM -A: Performed by: SURGERY

## 2019-01-25 PROCEDURE — 80053 COMPREHEN METABOLIC PANEL: CPT

## 2019-01-25 PROCEDURE — 74011250636 HC RX REV CODE- 250/636: Performed by: SURGERY

## 2019-01-25 PROCEDURE — 0DNW4ZZ RELEASE PERITONEUM, PERCUTANEOUS ENDOSCOPIC APPROACH: ICD-10-PCS | Performed by: SURGERY

## 2019-01-25 PROCEDURE — 74011000250 HC RX REV CODE- 250: Performed by: SURGERY

## 2019-01-25 PROCEDURE — 74011250636 HC RX REV CODE- 250/636

## 2019-01-25 PROCEDURE — 36415 COLL VENOUS BLD VENIPUNCTURE: CPT

## 2019-01-25 PROCEDURE — 74011250636 HC RX REV CODE- 250/636: Performed by: FAMILY MEDICINE

## 2019-01-25 PROCEDURE — 77030027744 HC PWDR HEMSTAT ARISTA ABSRB 5GM BARD -D: Performed by: SURGERY

## 2019-01-25 PROCEDURE — 77030033188 HC TBNG FLTRD BIIFUR DISP CNMD -C: Performed by: SURGERY

## 2019-01-25 PROCEDURE — 77030020703 HC SEAL CANN DISP INTU -B: Performed by: SURGERY

## 2019-01-25 PROCEDURE — 77030035489 HC REDUCR CANN ENDOWR INTU -C: Performed by: SURGERY

## 2019-01-25 PROCEDURE — 77030027743 HC APPL F/HEMSTAT BARD -B: Performed by: SURGERY

## 2019-01-25 PROCEDURE — 77030026438 HC STYL ET INTUB CARD -A: Performed by: NURSE ANESTHETIST, CERTIFIED REGISTERED

## 2019-01-25 PROCEDURE — 74011000254 HC RX REV CODE- 254: Performed by: SURGERY

## 2019-01-25 PROCEDURE — 77030035048 HC TRCR ENDOSC OPTCL COVD -B: Performed by: SURGERY

## 2019-01-25 PROCEDURE — 8E0W4CZ ROBOTIC ASSISTED PROCEDURE OF TRUNK REGION, PERCUTANEOUS ENDOSCOPIC APPROACH: ICD-10-PCS | Performed by: SURGERY

## 2019-01-25 PROCEDURE — 65270000029 HC RM PRIVATE

## 2019-01-25 PROCEDURE — 76060000034 HC ANESTHESIA 1.5 TO 2 HR: Performed by: SURGERY

## 2019-01-25 PROCEDURE — 77030008756 HC TU IRR SUC STRY -B: Performed by: SURGERY

## 2019-01-25 PROCEDURE — 77030039266 HC ADH SKN EXOFIN S2SG -A: Performed by: SURGERY

## 2019-01-25 PROCEDURE — 77030009852 HC PCH RTVR ENDOSC COVD -B: Performed by: SURGERY

## 2019-01-25 PROCEDURE — 77030032490 HC SLV COMPR SCD KNE COVD -B: Performed by: SURGERY

## 2019-01-25 PROCEDURE — 77030008684 HC TU ET CUF COVD -B: Performed by: NURSE ANESTHETIST, CERTIFIED REGISTERED

## 2019-01-25 PROCEDURE — 77030016151 HC PROTCTR LNS DFOG COVD -B: Performed by: SURGERY

## 2019-01-25 PROCEDURE — 77030039429 HC SUT PASSR CROSSBOW DISP ADLR -B: Performed by: SURGERY

## 2019-01-25 PROCEDURE — 76210000006 HC OR PH I REC 0.5 TO 1 HR: Performed by: SURGERY

## 2019-01-25 PROCEDURE — 74011250636 HC RX REV CODE- 250/636: Performed by: INTERNAL MEDICINE

## 2019-01-25 PROCEDURE — 77030010939 HC CLP LIG TELE -B: Performed by: SURGERY

## 2019-01-25 PROCEDURE — 76010000875 HC OR TIME 1.5 TO 2HR INTENSV - TIER 2: Performed by: SURGERY

## 2019-01-25 PROCEDURE — 77030009848 HC PASSR SUT SET COOP -C: Performed by: SURGERY

## 2019-01-25 PROCEDURE — 77030035277 HC OBTRTR BLDELSS DISP INTU -B: Performed by: SURGERY

## 2019-01-25 PROCEDURE — 85025 COMPLETE CBC W/AUTO DIFF WBC: CPT

## 2019-01-25 PROCEDURE — 0FT44ZZ RESECTION OF GALLBLADDER, PERCUTANEOUS ENDOSCOPIC APPROACH: ICD-10-PCS | Performed by: SURGERY

## 2019-01-25 PROCEDURE — 77030020782 HC GWN BAIR PAWS FLX 3M -B

## 2019-01-25 RX ORDER — ROCURONIUM BROMIDE 10 MG/ML
INJECTION, SOLUTION INTRAVENOUS AS NEEDED
Status: DISCONTINUED | OUTPATIENT
Start: 2019-01-25 | End: 2019-01-25 | Stop reason: HOSPADM

## 2019-01-25 RX ORDER — OXYCODONE AND ACETAMINOPHEN 10; 325 MG/1; MG/1
1 TABLET ORAL
Status: DISCONTINUED | OUTPATIENT
Start: 2019-01-25 | End: 2019-01-26 | Stop reason: HOSPADM

## 2019-01-25 RX ORDER — ONDANSETRON 4 MG/1
4 TABLET, ORALLY DISINTEGRATING ORAL
Qty: 10 TAB | Refills: 1 | Status: SHIPPED | OUTPATIENT
Start: 2019-01-25 | End: 2022-08-03

## 2019-01-25 RX ORDER — DEXAMETHASONE SODIUM PHOSPHATE 4 MG/ML
INJECTION, SOLUTION INTRA-ARTICULAR; INTRALESIONAL; INTRAMUSCULAR; INTRAVENOUS; SOFT TISSUE AS NEEDED
Status: DISCONTINUED | OUTPATIENT
Start: 2019-01-25 | End: 2019-01-25 | Stop reason: HOSPADM

## 2019-01-25 RX ORDER — SODIUM CHLORIDE, SODIUM LACTATE, POTASSIUM CHLORIDE, CALCIUM CHLORIDE 600; 310; 30; 20 MG/100ML; MG/100ML; MG/100ML; MG/100ML
INJECTION, SOLUTION INTRAVENOUS
Status: DISCONTINUED | OUTPATIENT
Start: 2019-01-25 | End: 2019-01-25 | Stop reason: HOSPADM

## 2019-01-25 RX ORDER — SUCCINYLCHOLINE CHLORIDE 20 MG/ML
INJECTION INTRAMUSCULAR; INTRAVENOUS AS NEEDED
Status: DISCONTINUED | OUTPATIENT
Start: 2019-01-25 | End: 2019-01-25 | Stop reason: HOSPADM

## 2019-01-25 RX ORDER — ONDANSETRON 2 MG/ML
INJECTION INTRAMUSCULAR; INTRAVENOUS AS NEEDED
Status: DISCONTINUED | OUTPATIENT
Start: 2019-01-25 | End: 2019-01-25 | Stop reason: HOSPADM

## 2019-01-25 RX ORDER — FENTANYL CITRATE 50 UG/ML
INJECTION, SOLUTION INTRAMUSCULAR; INTRAVENOUS AS NEEDED
Status: DISCONTINUED | OUTPATIENT
Start: 2019-01-25 | End: 2019-01-25 | Stop reason: HOSPADM

## 2019-01-25 RX ORDER — DIPHENHYDRAMINE HYDROCHLORIDE 50 MG/ML
12.5 INJECTION, SOLUTION INTRAMUSCULAR; INTRAVENOUS AS NEEDED
Status: DISCONTINUED | OUTPATIENT
Start: 2019-01-25 | End: 2019-01-25 | Stop reason: HOSPADM

## 2019-01-25 RX ORDER — MIDAZOLAM HYDROCHLORIDE 1 MG/ML
2 INJECTION, SOLUTION INTRAMUSCULAR; INTRAVENOUS
Status: DISCONTINUED | OUTPATIENT
Start: 2019-01-25 | End: 2019-01-25 | Stop reason: HOSPADM

## 2019-01-25 RX ORDER — INDOCYANINE GREEN AND WATER 25 MG
5 KIT INJECTION
Status: COMPLETED | OUTPATIENT
Start: 2019-01-25 | End: 2019-01-25

## 2019-01-25 RX ORDER — PROPOFOL 10 MG/ML
INJECTION, EMULSION INTRAVENOUS AS NEEDED
Status: DISCONTINUED | OUTPATIENT
Start: 2019-01-25 | End: 2019-01-25 | Stop reason: HOSPADM

## 2019-01-25 RX ORDER — MIDAZOLAM HYDROCHLORIDE 1 MG/ML
INJECTION, SOLUTION INTRAMUSCULAR; INTRAVENOUS AS NEEDED
Status: DISCONTINUED | OUTPATIENT
Start: 2019-01-25 | End: 2019-01-25 | Stop reason: HOSPADM

## 2019-01-25 RX ORDER — OXYCODONE AND ACETAMINOPHEN 5; 325 MG/1; MG/1
1-2 TABLET ORAL
Qty: 20 TAB | Refills: 0 | Status: SHIPPED | OUTPATIENT
Start: 2019-01-25 | End: 2022-08-03

## 2019-01-25 RX ORDER — OXYCODONE AND ACETAMINOPHEN 5; 325 MG/1; MG/1
1 TABLET ORAL
Status: DISCONTINUED | OUTPATIENT
Start: 2019-01-25 | End: 2019-01-26 | Stop reason: HOSPADM

## 2019-01-25 RX ORDER — HYDROMORPHONE HYDROCHLORIDE 2 MG/ML
INJECTION, SOLUTION INTRAMUSCULAR; INTRAVENOUS; SUBCUTANEOUS AS NEEDED
Status: DISCONTINUED | OUTPATIENT
Start: 2019-01-25 | End: 2019-01-25 | Stop reason: HOSPADM

## 2019-01-25 RX ORDER — KETOROLAC TROMETHAMINE 30 MG/ML
INJECTION, SOLUTION INTRAMUSCULAR; INTRAVENOUS AS NEEDED
Status: DISCONTINUED | OUTPATIENT
Start: 2019-01-25 | End: 2019-01-25 | Stop reason: HOSPADM

## 2019-01-25 RX ORDER — HYDROMORPHONE HYDROCHLORIDE 1 MG/ML
.25-1 INJECTION, SOLUTION INTRAMUSCULAR; INTRAVENOUS; SUBCUTANEOUS
Status: DISCONTINUED | OUTPATIENT
Start: 2019-01-25 | End: 2019-01-25 | Stop reason: HOSPADM

## 2019-01-25 RX ORDER — LIDOCAINE HYDROCHLORIDE 20 MG/ML
INJECTION, SOLUTION EPIDURAL; INFILTRATION; INTRACAUDAL; PERINEURAL AS NEEDED
Status: DISCONTINUED | OUTPATIENT
Start: 2019-01-25 | End: 2019-01-25 | Stop reason: HOSPADM

## 2019-01-25 RX ORDER — CEFAZOLIN SODIUM/WATER 2 G/20 ML
2 SYRINGE (ML) INTRAVENOUS ONCE
Status: COMPLETED | OUTPATIENT
Start: 2019-01-25 | End: 2019-01-25

## 2019-01-25 RX ORDER — SODIUM CHLORIDE, SODIUM LACTATE, POTASSIUM CHLORIDE, CALCIUM CHLORIDE 600; 310; 30; 20 MG/100ML; MG/100ML; MG/100ML; MG/100ML
125 INJECTION, SOLUTION INTRAVENOUS CONTINUOUS
Status: DISCONTINUED | OUTPATIENT
Start: 2019-01-25 | End: 2019-01-26

## 2019-01-25 RX ADMIN — HYDROMORPHONE HYDROCHLORIDE 0.5 MG: 2 INJECTION, SOLUTION INTRAMUSCULAR; INTRAVENOUS; SUBCUTANEOUS at 08:43

## 2019-01-25 RX ADMIN — ACETAMINOPHEN 650 MG: 325 TABLET ORAL at 20:16

## 2019-01-25 RX ADMIN — KETOROLAC TROMETHAMINE 30 MG: 30 INJECTION, SOLUTION INTRAMUSCULAR; INTRAVENOUS at 09:24

## 2019-01-25 RX ADMIN — ROCURONIUM BROMIDE 20 MG: 10 INJECTION, SOLUTION INTRAVENOUS at 07:56

## 2019-01-25 RX ADMIN — ENOXAPARIN SODIUM 40 MG: 40 INJECTION SUBCUTANEOUS at 14:50

## 2019-01-25 RX ADMIN — ONDANSETRON 4 MG: 2 INJECTION INTRAMUSCULAR; INTRAVENOUS at 08:05

## 2019-01-25 RX ADMIN — PROPOFOL 50 MG: 10 INJECTION, EMULSION INTRAVENOUS at 07:47

## 2019-01-25 RX ADMIN — MIDAZOLAM HYDROCHLORIDE 2 MG: 1 INJECTION, SOLUTION INTRAMUSCULAR; INTRAVENOUS at 07:38

## 2019-01-25 RX ADMIN — SUCCINYLCHOLINE CHLORIDE 100 MG: 20 INJECTION INTRAMUSCULAR; INTRAVENOUS at 07:46

## 2019-01-25 RX ADMIN — ROCURONIUM BROMIDE 10 MG: 10 INJECTION, SOLUTION INTRAVENOUS at 07:47

## 2019-01-25 RX ADMIN — FENTANYL CITRATE 250 MCG: 50 INJECTION, SOLUTION INTRAMUSCULAR; INTRAVENOUS at 07:46

## 2019-01-25 RX ADMIN — INSULIN LISPRO 2 UNITS: 100 INJECTION, SOLUTION INTRAVENOUS; SUBCUTANEOUS at 17:49

## 2019-01-25 RX ADMIN — SODIUM CHLORIDE, SODIUM LACTATE, POTASSIUM CHLORIDE, AND CALCIUM CHLORIDE 125 ML/HR: 600; 310; 30; 20 INJECTION, SOLUTION INTRAVENOUS at 15:29

## 2019-01-25 RX ADMIN — SODIUM CHLORIDE, SODIUM LACTATE, POTASSIUM CHLORIDE, CALCIUM CHLORIDE: 600; 310; 30; 20 INJECTION, SOLUTION INTRAVENOUS at 09:00

## 2019-01-25 RX ADMIN — Medication 2 G: at 08:00

## 2019-01-25 RX ADMIN — DEXTROSE MONOHYDRATE, SODIUM CHLORIDE, AND POTASSIUM CHLORIDE 75 ML/HR: 50; 4.5; 1.49 INJECTION, SOLUTION INTRAVENOUS at 20:12

## 2019-01-25 RX ADMIN — ROCURONIUM BROMIDE 5 MG: 10 INJECTION, SOLUTION INTRAVENOUS at 08:59

## 2019-01-25 RX ADMIN — SODIUM CHLORIDE, SODIUM LACTATE, POTASSIUM CHLORIDE, CALCIUM CHLORIDE: 600; 310; 30; 20 INJECTION, SOLUTION INTRAVENOUS at 07:31

## 2019-01-25 RX ADMIN — LIDOCAINE HYDROCHLORIDE 60 MG: 20 INJECTION, SOLUTION EPIDURAL; INFILTRATION; INTRACAUDAL; PERINEURAL at 07:46

## 2019-01-25 RX ADMIN — DEXAMETHASONE SODIUM PHOSPHATE 8 MG: 4 INJECTION, SOLUTION INTRA-ARTICULAR; INTRALESIONAL; INTRAMUSCULAR; INTRAVENOUS; SOFT TISSUE at 08:05

## 2019-01-25 RX ADMIN — PROPOFOL 150 MG: 10 INJECTION, EMULSION INTRAVENOUS at 07:46

## 2019-01-25 RX ADMIN — INDOCYANINE GREEN AND WATER 5 MG: KIT at 07:38

## 2019-01-25 RX ADMIN — TIMOLOL MALEATE 1 DROP: 5 SOLUTION/ DROPS OPHTHALMIC at 18:00

## 2019-01-25 NOTE — H&P
Surgical H&P Update    Patient seen and examined. Is s/p ERCP yesterday, presents for cholecystectomy. No abdominal pain, nausea, emesis or fevers. All questions regarding risk and benefits of surgical procedure answered. Consent for robot-assisted laparoscopic cholecystectomy possible open obtained.     Racheal Jeronimo MD  01/25/19

## 2019-01-25 NOTE — ROUTINE PROCESS
TRANSFER - OUT REPORT:    Verbal report given to Mariela Daly RN(name) on Ava Spring  being transferred to [facility](unit) for routine post - op       Report consisted of patients Situation, Background, Assessment and   Recommendations(SBAR). Information from the following report(s) SBAR and MAR was reviewed with the receiving nurse. Opportunity for questions and clarification was provided.       Patient transported with:   O2 @ 2 liters  Registered Nurse

## 2019-01-25 NOTE — ANESTHESIA PREPROCEDURE EVALUATION
Anesthetic History   No history of anesthetic complications            Review of Systems / Medical History  Patient summary reviewed, nursing notes reviewed and pertinent labs reviewed    Pulmonary  Within defined limits                 Neuro/Psych   Within defined limits           Cardiovascular  Within defined limits  Hypertension              Exercise tolerance: >4 METS     GI/Hepatic/Renal  Within defined limits   GERD           Endo/Other  Within defined limits  Diabetes         Other Findings              Physical Exam    Airway  Mallampati: II    Neck ROM: normal range of motion   Mouth opening: Normal     Cardiovascular  Regular rate and rhythm,  S1 and S2 normal,  no murmur, click, rub, or gallop  Rhythm: regular  Rate: normal         Dental  No notable dental hx       Pulmonary  Breath sounds clear to auscultation               Abdominal  GI exam deferred       Other Findings            Anesthetic Plan    ASA: 2  Anesthesia type: general          Induction: Intravenous  Anesthetic plan and risks discussed with: Patient      Pt had ERCP yesterday - uneventful except for minor lip laceration

## 2019-01-25 NOTE — BRIEF OP NOTE
BRIEF OPERATIVE NOTE    Date of Procedure: 1/25/2019   Preoperative Diagnosis: GALLSTONE PANCREATITIS WITH CHOLEDOCHOLITHIASIS  Postoperative Diagnosis: GALLSTONE PANCREATITIS WITH CHOLEDOCHOLITHIASIS, INTRA-ABDOMINAL ADHESIONS    Procedure(s):  CHOLECYSTECTOMY ROBOTIC ASSISTED  Surgeon(s) and Role:     Melisa Ying MD - Primary         Surgical Assistant: Melvi Blanco    Surgical Staff:  Circ-1: Silvino Acevedo RN  Circ-Intern: Hari Hughes RN  Scrub Tech-1: Osito Ziegler  Surg Asst-1: Timo Srivastava Time In Time Out   Incision Start 01/25/2019 0810    Incision Close       Anesthesia: General   Estimated Blood Loss: 25 cc  Specimens:   ID Type Source Tests Collected by Time Destination   1 : Gallbladder Preservative Gallbladder  Moncho Chakraborty MD 1/25/2019 9843 Pathology      Findings: Intra-abdominal adhesions of omentum to abdominal wall in the midline.  Inflamed distended gallbladder with attached omental adhesions    Complications: None  Implants: * No implants in log *

## 2019-01-25 NOTE — OP NOTES
Date: 01/25/19    Pre-operative Diagnosis: Gallstone pancreatitis with choledocholithiasis    Post-operative Diagnosis:  Gallstone pancreatitis with choledocholithiasis, intra-abdominal adhesions    Procedure: Robot-assisted laparoscopic cholecystectomy    Surgeon: Lashay Williamson MD    Assistant:  Jose Martin Wilkerson    Anesthesia: General    Estimated Blood Loss: 25 cc    Findings: Intra-abdominal adhesions of omentum to abdominal wall in the midline. Inflamed distended gallbladder with attached omental adhesions    Specimen: Gallbladder    Complication: None    Implant: None     Indication: This is a 77year-old female who was admitted with gallstone pancreatitis and evidence of choledocholithiasis. She underwent ERCP 1/24/19 with sphincterotomy and removal of debris and sludge. She presents for cholecystectomy following resolution of her pancreatitis.      Procedure: The patient was brought to the operating room and underwent general anesthesia and endotracheal intubation. All appropriate monitoring devices were placed. The patient was placed supine on the operating table. All pressure points were padded and then the abdomen was prepped and draped in the usual sterile fashion. A time out was completed verifying patient, procedure, site, positioning and any needed special equipment prior to beginning this procedure. Pre-incision antibiotics were given 30 minutes prior to skin incision. She received ICG pre-operatively. The laparoscopic camera and CO2 insufflation apparatus were affixed to the drapes in the usual fashion. Pre-incision 1% lidocaine with epinephrine and 0.5% bupivicaine anesthetic was injected at the infra-umbilical incision. Through a horizontal infra-umbilical skin incision, the abdomen was entered via a placement of a 5 mm optically guided trocar. Insufflation was established satisfactorily and maintained at 15mmHg.  The laparoscopic camera was introduced and it was confirmed that there was no intraabdominal visceral injury or bleeding in attaining access. Under direct laparoscopic vision, an 8 mm robot port was placed in the right subcostal region, an additional 8 mm robot port was placed in the left subcostal midclavicular region. The 5 mm port was up-sized to a 12 mm robot port, a 5 mm assistant port was placed in between the left subcostal and infra-umbilical port sites. The remote centers of the robot ports were confirmed to be in the abdominal wall. The patient was positioned in reverse Trendelenburg with left tilt. The robot was docked in standard fashion. There were intra-abdominal adhesions of omentum to abdominal wall in the midline. I proceeded to lyse these with blunt dissection for 20 minutes in order to place the ports. The gallbladder was inflamed and distended and had attached omental adhesions. These was lysed with blunt dissection and electrocautery. The fundus was grasped and lifted up towards the diaphragm. The infundibulum was retracted laterally and inferiorly. The cystic duct and artery were dissected free from the peritoneum and adjacent lymphatic tissue with a combination of blunt dissection and electrocautery. The critical view of Calot's triangle was obtained and the structures were clearly identified. Firefly cholangiography was used to further delineate the cystic duct and common bile duct.       The cystic duct was clipped 3 times divided with scissors. The cystic artery was clipped and divided. With electrocautery, the gallbladder was then gently dissected completely off from the liver bed and placed in a retrieval bag. Hemostasis was obtained with electrocautery and Tammy.      The ports were removed under direct laparoscopic vision, there was no preperitoneal, rectus arterial or venous bleeding. Additional local anesthesia was injected into all the port sites.       The 12 infra-umbilical fascial defect was closed with a 0-PDS.  All skin incisions were closed with subcuticular 4-0 Monocryl, and Dermabond. The patient awoke in satisfactory condition. Sponge and instrument counts were correct x 2. I directly went to discuss the findings with the patient's family in the waiting area.      Merna Jackson MD

## 2019-01-25 NOTE — DISCHARGE INSTRUCTIONS
PATIENT INSTRUCTIONS  GALL BLADDER SURGERY  (CHOLECYSTECTOMY)     FOLLOW-UP: Please make an appointment with your physician in 2 week(s). Call your physician immediately if you have any fevers greater than 101 F, drainage from your wound that is not clear or looks infected, persistent bleeding, increasing abdominal pain, problems urinating, or persistent nausea/vomiting. You should be aware that you may have right shoulder pain after surgery and that this will progressively go away. This is called 'referred pain' and is from the area of the gallbladder. It can also be caused by gas that may be trapped under the diaphragm from the surgery, especially if it was performed laparoscopically through mini-incisions. This gas will progressively get reabsorbed by your body.      WOUND CARE INSTRUCTIONS: Dermabond liquid skin bandage applied. You may shower. Do not scrape off. If clothing rubs against the wound or causes irritation and the wound is not draining you may cover it with a dry dressing during the daytime. Try to keep the wound dry and avoid ointments on the wound unless directed to do so. If the wound becomes bright red and painful or starts to drain infected material that is not clear, please contact your physician immediately. You should also call if you begin to drain fluid that is thin and greenish-brown from the wound and appears to look like bile. If the wound though is mildly pink and has a thick firm ridge underneath it, this is normal, and is referred to as a healing ridge. This will resolve over the next 4-6 weeks.     DIET: You may eat any foods that you can tolerate. It is a good idea to eat a high fiber diet and take in plenty of fluids to prevent constipation. If you do become constipated you may want to take a mild laxative or take ducolax tablets on a daily basis until your bowel habits are regular.  Constipation can be very uncomfortable, along with straining, after recent abdominal surgery.     ACTIVITY: You are encouraged to cough and deep breath or use your incentive spirometer if you were given one, every 15-30 minutes when awake. This will help prevent respiratory complications and low grade fevers post-operatively. You may want to hug a pillow when coughing and sneezing to add additional support to the surgical area(s) which will decrease pain during these times. You are encouraged to walk and engage in light activity for the next two weeks. You should not lift more than 20 pounds during this time frame as it could put you at increased risk for a post-operative hernia. Twenty pounds is roughly equivalent to a plastic bag of groceries.      MEDICATIONS: Try to take narcotic medications and anti-inflammatory medications, such as tylenol, ibuprofen, naprosyn, etc., with food. This will minimize stomach upset from the medication. Should you develop nausea and vomiting from the pain medication, or develop a rash, please discontinue the medication and contact your physician. You should not drive, make important decisions, or operate machinery when taking narcotic pain medication.     QUESTIONS: Please feel free to call your physician or the hospital  if you have any questions, and they will be glad to assist you.

## 2019-01-25 NOTE — ANESTHESIA POSTPROCEDURE EVALUATION
Procedure(s):  CHOLECYSTECTOMY ROBOTIC ASSISTED.     Anesthesia Post Evaluation      Multimodal analgesia: multimodal analgesia not used between 6 hours prior to anesthesia start to PACU discharge  Patient location during evaluation: PACU  Patient participation: complete - patient participated  Level of consciousness: awake  Pain management: adequate  Airway patency: patent  Anesthetic complications: no  Cardiovascular status: acceptable, blood pressure returned to baseline and hemodynamically stable  Respiratory status: acceptable  Hydration status: acceptable  Post anesthesia nausea and vomiting:  none      Visit Vitals  /53   Pulse 75   Temp 36.9 °C (98.5 °F)   Resp 13   Ht 5' 1\" (1.549 m)   Wt 77.6 kg (171 lb)   SpO2 96%   BMI 32.31 kg/m²

## 2019-01-25 NOTE — PROGRESS NOTES
Bedside shift change report given to Chris (oncoming nurse) by Brant Sullivan (offgoing nurse). Report included the following information SBAR, Kardex, Intake/Output, MAR, Accordion and Recent Results.

## 2019-01-25 NOTE — PROGRESS NOTES
Daily Progress Note: 1/25/2019  Dearl MD Mendy    Assessment/Plan:   Pancreatitis / Leukocytosis / LFT elevation / Abdominal pain / Nausea & vomiting / hyperbilirubinemia - POA, presumed gallstone pancreatitis. -- Surgery consulted   -- ERCP 1/24     -- NPO.   -- IVF. -- Pain control  -- OR 1/25     Elevated blood glucose - POA, no hx of DM. -- A1c 7.0  -- Will ask diabetes ed to see     Dehydration - POA due to anorexia and vomiting.   --  Hydrate and follow labs.     Hypertension -   -- Holding lisinopril and Dyazide     -- Hold ASA anticipating ERCP.     Hyperlipidemia -   -- Holding atorvastatin in setting of LFTs and NPO     Glaucoma - continue timolol.     GERD - continue PPI         Problem List:  Problem List as of 1/25/2019 Date Reviewed: 1/23/2019          Codes Class Noted - Resolved    Leukocytosis ICD-10-CM: D72.829  ICD-9-CM: 288.60  1/23/2019 - Present        * (Principal) Pancreatitis ICD-10-CM: K85.90  ICD-9-CM: 577.0  1/23/2019 - Present        DM type 2 causing renal disease ([facility] [address] ICD-10-CM: E11.29  ICD-9-CM: 250.40  Unknown - Present        Dehydration ICD-10-CM: E86.0  ICD-9-CM: 276.51  1/23/2019 - Present        LFT elevation ICD-10-CM: R94.5  ICD-9-CM: 790.6  1/23/2019 - Present        Abdominal pain ICD-10-CM: R10.9  ICD-9-CM: 789.00  1/23/2019 - Present        Nausea & vomiting ICD-10-CM: R11.2  ICD-9-CM: 787.01  1/23/2019 - Present        GERD (gastroesophageal reflux disease) ICD-10-CM: K21.9  ICD-9-CM: 530.81  Unknown - Present        Glaucoma ICD-10-CM: H40.9  ICD-9-CM: 365.9  Unknown - Present        HTN (hypertension) ICD-10-CM: I10  ICD-9-CM: 401.9  Unknown - Present        Hyperlipidemia ICD-10-CM: E78.5  ICD-9-CM: 272.4  Unknown - Present              HPI:   Ms. Isaac Shaikh is a 77 y.o.  female who presented to the Emergency Department complaining of abd pain. Sharp, mid upper gastric, radiating to back.   Today associated with nausea and vomiting. Started 2 hours after chicken and candy bar lunch yesterday. Similar milder symptoms twice in last 2 months, both times about an hour after eating. ER workup shows pancreatitis, and US suggests gallstones. We will admit her for management. (Dr Bryanna Munoz)    : She is feeling better today except for a headache. No further nausea or vomiting. Less pain. Has been seen by GI and Surg. Plan for ERCP this afternoon and GB removal when pancreatitis is resolved. She is having some swelling in her right hand so will decrease fluids. AM labs pending. : In OR. Review of Systems:   A comprehensive review of systems was negative except for that written in the HPI. Objective:   Physical Exam:     Visit Vitals  /48 (BP 1 Location: Left arm, BP Patient Position: At rest;Sitting)   Pulse 61   Temp 98.5 °F (36.9 °C)   Resp 16   Ht 5' 1\" (1.549 m)   Wt 171 lb (77.6 kg)   SpO2 97%   BMI 32.31 kg/m²    O2 Flow Rate (L/min): 3 l/min O2 Device: Room air    Temp (24hrs), Av.5 °F (36.9 °C), Min:98.2 °F (36.8 °C), Max:98.9 °F (37.2 °C)    No intake/output data recorded.  1901 -  0700  In: 4075.4 [I.V.:4075.4]  Out: -     General:  Alert, cooperative, no distress, appears stated age. Head:  Normocephalic, without obvious abnormality, atraumatic. Eyes:  Conjunctivae/corneas clear. PERRL, EOMs intact. Nose: Nares normal. Septum midline. Mucosa normal. No drainage or sinus tenderness. Throat: Lips, mucosa, and tongue normal. Teeth and gums normal.   Neck: Supple, symmetrical, trachea midline, no adenopathy, thyroid: no enlargement/tenderness/nodules, no carotid bruit and no JVD. Back:   Symmetric, no curvature. ROM normal. No CVA tenderness. Lungs:   Clear to auscultation bilaterally. Chest wall:  No tenderness or deformity. Heart:  Regular rate and rhythm, S1, S2 normal, no murmur, click, rub or gallop. Abdomen:   Soft, mildly tender upper abd.  Bowel sounds normal. No masses, No organomegaly. Extremities: Extremities normal, atraumatic, no cyanosis or edema. No calf tenderness or cords. Pulses: 2+ and symmetric all extremities. Skin: Skin color, texture, turgor normal. No rashes or lesions   Neurologic: CNII-XII intact. Alert and oriented X 3. Fine motor of hands and fingers normal.   equal.  No cogwheeling or rigidity. Gait not tested at this time. Sensation grossly normal to touch. Gross motor of extremities normal.     Currently in OR  Data Review:       Recent Days:  Recent Labs     01/25/19 0310 01/24/19 0737 01/23/19 0741   WBC 7.9 8.2 14.9*   HGB 9.7* 9.8* 12.3   HCT 32.9* 31.9* 39.2    191 328     Recent Labs     01/25/19 0310 01/24/19 0737 01/23/19 0742 01/23/19 0741    140  --  141   K 4.2 4.3  --  3.7    105  --  101   CO2 27 26  --  27   * 137*  --  161*   BUN 12 13  --  24*   CREA 0.90 0.92  --  1.06*   CA 8.3* 8.0*  --  9.4   ALB 2.5* 2.7*  --  3.7   TBILI 0.4 0.4  --  2.1*   SGOT 54* 110*  --  354*   ALT 91* 126*  --  192*   INR  --   --  1.0  --      No results for input(s): PH, PCO2, PO2, HCO3, FIO2 in the last 72 hours.     24 Hour Results:  Recent Results (from the past 24 hour(s))   GLUCOSE, POC    Collection Time: 01/24/19  1:27 PM   Result Value Ref Range    Glucose (POC) 98 65 - 100 mg/dL    Performed by Rakan Barnhart    GLUCOSE, POC    Collection Time: 01/24/19  5:28 PM   Result Value Ref Range    Glucose (POC) 126 (H) 65 - 100 mg/dL    Performed by Trey Tong (PCT)    GLUCOSE, POC    Collection Time: 01/25/19 12:07 AM   Result Value Ref Range    Glucose (POC) 131 (H) 65 - 100 mg/dL    Performed by Cj Chavarria (PCT)    CBC WITH AUTOMATED DIFF    Collection Time: 01/25/19  3:10 AM   Result Value Ref Range    WBC 7.9 3.6 - 11.0 K/uL    RBC 3.62 (L) 3.80 - 5.20 M/uL    HGB 9.7 (L) 11.5 - 16.0 g/dL    HCT 32.9 (L) 35.0 - 47.0 %    MCV 90.9 80.0 - 99.0 FL    MCH 26.8 26.0 - 34.0 PG    MCHC 29.5 (L) 30.0 - 36.5 g/dL    RDW 13.2 11.5 - 14.5 %    PLATELET 079 581 - 473 K/uL    MPV 9.5 8.9 - 12.9 FL    NRBC 0.0 0  WBC    ABSOLUTE NRBC 0.00 0.00 - 0.01 K/uL    NEUTROPHILS 64 32 - 75 %    LYMPHOCYTES 22 12 - 49 %    MONOCYTES 11 5 - 13 %    EOSINOPHILS 2 0 - 7 %    BASOPHILS 0 0 - 1 %    IMMATURE GRANULOCYTES 1 (H) 0.0 - 0.5 %    ABS. NEUTROPHILS 5.1 1.8 - 8.0 K/UL    ABS. LYMPHOCYTES 1.8 0.8 - 3.5 K/UL    ABS. MONOCYTES 0.8 0.0 - 1.0 K/UL    ABS. EOSINOPHILS 0.2 0.0 - 0.4 K/UL    ABS. BASOPHILS 0.0 0.0 - 0.1 K/UL    ABS. IMM. GRANS. 0.0 0.00 - 0.04 K/UL    DF AUTOMATED     METABOLIC PANEL, COMPREHENSIVE    Collection Time: 01/25/19  3:10 AM   Result Value Ref Range    Sodium 142 136 - 145 mmol/L    Potassium 4.2 3.5 - 5.1 mmol/L    Chloride 108 97 - 108 mmol/L    CO2 27 21 - 32 mmol/L    Anion gap 7 5 - 15 mmol/L    Glucose 121 (H) 65 - 100 mg/dL    BUN 12 6 - 20 MG/DL    Creatinine 0.90 0.55 - 1.02 MG/DL    BUN/Creatinine ratio 13 12 - 20      GFR est AA >60 >60 ml/min/1.73m2    GFR est non-AA >60 >60 ml/min/1.73m2    Calcium 8.3 (L) 8.5 - 10.1 MG/DL    Bilirubin, total 0.4 0.2 - 1.0 MG/DL    ALT (SGPT) 91 (H) 12 - 78 U/L    AST (SGOT) 54 (H) 15 - 37 U/L    Alk.  phosphatase 136 (H) 45 - 117 U/L    Protein, total 5.9 (L) 6.4 - 8.2 g/dL    Albumin 2.5 (L) 3.5 - 5.0 g/dL    Globulin 3.4 2.0 - 4.0 g/dL    A-G Ratio 0.7 (L) 1.1 - 2.2     LIPASE    Collection Time: 01/25/19  3:10 AM   Result Value Ref Range    Lipase 176 73 - 393 U/L   GLUCOSE, POC    Collection Time: 01/25/19  6:05 AM   Result Value Ref Range    Glucose (POC) 127 (H) 65 - 100 mg/dL    Performed by Shayan Mcbride (PCT)        Medications reviewed  Current Facility-Administered Medications   Medication Dose Route Frequency    iopamidol (ISOVUE 300) 61 % contrast injection 50 mL  50 mL IntraCATHeter MULTIPLE DOSE GIVEN    indomethacin (INDOCIN) rectal suppository 100 mg  100 mg Rectal ONCE PRN    glucagon (GLUCAGEN) injection 1 mg  1 mg IntraVENous PRN    dextrose 5% - 0.45% NaCl with KCl 20 mEq/L infusion  75 mL/hr IntraVENous CONTINUOUS    pantoprazole (PROTONIX) tablet 40 mg  40 mg Oral DAILY    naloxone (NARCAN) injection 0.4 mg  0.4 mg IntraVENous PRN    glucose chewable tablet 16 g  4 Tab Oral PRN    dextrose (D50W) injection syrg 12.5-25 g  25-50 mL IntraVENous PRN    glucagon (GLUCAGEN) injection 1 mg  1 mg IntraMUSCular PRN    acetaminophen (TYLENOL) tablet 650 mg  650 mg Oral Q4H PRN    HYDROmorphone (PF) (DILAUDID) injection 0.5 mg  0.5 mg IntraVENous Q4H PRN    diphenhydrAMINE (BENADRYL) capsule 25 mg  25 mg Oral Q4H PRN    ondansetron (ZOFRAN) injection 4 mg  4 mg IntraVENous Q4H PRN    enoxaparin (LOVENOX) injection 40 mg  40 mg SubCUTAneous Q24H    insulin lispro (HUMALOG) injection   SubCUTAneous Q6H    timolol (TIMOPTIC) 0.5 % ophthalmic solution 1 Drop  1 Drop Both Eyes BID     Facility-Administered Medications Ordered in Other Encounters   Medication Dose Route Frequency    midazolam (VERSED) injection   IntraVENous PRN    fentaNYL citrate (PF) injection    PRN    lidocaine (PF) (XYLOCAINE) 20 mg/mL (2 %) injection   IntraVENous PRN    propofol (DIPRIVAN) 10 mg/mL injection   IntraVENous PRN    rocuronium (ZEMURON) injection   IntraVENous PRN    succinylcholine (ANECTINE) injection   IntraVENous PRN    dexamethasone (DECADRON) 4 mg/mL injection    PRN    ondansetron (ZOFRAN) injection   IntraVENous PRN    lactated Ringers infusion   IntraVENous CONTINUOUS           Total time spent with patient and review of records:     Benjamin Veronica MD

## 2019-01-25 NOTE — PROGRESS NOTES
Gastroenterology Progress Note    January 25, 2019  Admit Date: 1/23/2019         Narrative Assessment and Plan   · Gallstone pancreatitis  · Biliary obstruction s/p ERCP  · Abnormal liver enzymes - improving    Plan  - no additional GI procedures needed  - postop management per surgery  - will be available as needed over weekend    Alysia Solorzano MD     Subjective:   · Just returned from OR. Sleeping - did not disturb. Talked with family at bedside. 1/24/19 ERCP performed sphincterotomy and then used a 9mm, 12mm balloon to sweep the duct. A generous quantity of crystalline debris and mucus and sludge was extracted. ROS:  The previous review of systems on initial consultation / H&P is noted and reviewed. Specific changes noted above in HPI.     Current Medications:     Current Facility-Administered Medications   Medication Dose Route Frequency    iopamidol (ISOVUE 300) 61 % contrast injection 50 mL  50 mL IntraCATHeter MULTIPLE DOSE GIVEN    indomethacin (INDOCIN) rectal suppository 100 mg  100 mg Rectal ONCE PRN    glucagon (GLUCAGEN) injection 1 mg  1 mg IntraVENous PRN    dextrose 5% - 0.45% NaCl with KCl 20 mEq/L infusion  75 mL/hr IntraVENous CONTINUOUS    pantoprazole (PROTONIX) tablet 40 mg  40 mg Oral DAILY    naloxone (NARCAN) injection 0.4 mg  0.4 mg IntraVENous PRN    glucose chewable tablet 16 g  4 Tab Oral PRN    dextrose (D50W) injection syrg 12.5-25 g  25-50 mL IntraVENous PRN    glucagon (GLUCAGEN) injection 1 mg  1 mg IntraMUSCular PRN    acetaminophen (TYLENOL) tablet 650 mg  650 mg Oral Q4H PRN    HYDROmorphone (PF) (DILAUDID) injection 0.5 mg  0.5 mg IntraVENous Q4H PRN    diphenhydrAMINE (BENADRYL) capsule 25 mg  25 mg Oral Q4H PRN    ondansetron (ZOFRAN) injection 4 mg  4 mg IntraVENous Q4H PRN    enoxaparin (LOVENOX) injection 40 mg  40 mg SubCUTAneous Q24H    insulin lispro (HUMALOG) injection   SubCUTAneous Q6H    timolol (TIMOPTIC) 0.5 % ophthalmic solution 1 Drop  1 Drop Both Eyes BID       Objective:     VITALS:   Last 24hrs VS reviewed since prior progress note. Most recent are:  Visit Vitals  /60 (BP 1 Location: Right arm, BP Patient Position: At rest)   Pulse 68   Temp 98.3 °F (36.8 °C)   Resp 12   Ht 5' 1\" (1.549 m)   Wt 77.6 kg (171 lb)   SpO2 95%   BMI 32.31 kg/m²     Temp (24hrs), Av.5 °F (36.9 °C), Min:98.2 °F (36.8 °C), Max:98.9 °F (37.2 °C)      Intake/Output Summary (Last 24 hours) at 2019 1104  Last data filed at 2019 0919  Gross per 24 hour   Intake 3218.75 ml   Output --   Net 3218.75 ml       EXAM:  General: Comfortable, no distress    Lungs:  No respiratory distress     Lab Data Reviewed:   Recent Labs     19  0737 19  0741   WBC 7.9 8.2 14.9*   HGB 9.7* 9.8* 12.3   HCT 32.9* 31.9* 39.2    191 328     Recent Labs     19  0737 19  0742 19  0741    140  --  141   K 4.2 4.3  --  3.7    105  --  101   CO2 27 26  --  27   * 137*  --  161*   BUN 12 13  --  24*   CREA 0.90 0.92  --  1.06*   CA 8.3* 8.0*  --  9.4   ALB 2.5* 2.7*  --  3.7   TBILI 0.4 0.4  --  2.1*   SGOT 54* 110*  --  354*   ALT 91* 126*  --  192*   INR  --   --  1.0  --      Lab Results   Component Value Date/Time    Glucose (POC) 127 (H) 2019 06:05 AM    Glucose (POC) 131 (H) 2019 12:07 AM    Glucose (POC) 126 (H) 2019 05:28 PM    Glucose (POC) 98 2019 01:27 PM    Glucose (POC) 153 (H) 2019 06:17 AM     No results for input(s): PH, PCO2, PO2, HCO3, FIO2 in the last 72 hours.   Recent Labs     19  0742   INR 1.0           Assessment:   (See above)  Principal Problem:    Pancreatitis (2019)    Active Problems:    Leukocytosis (2019)      DM type 2 causing renal disease (H[facility]) ()      Dehydration (2019)      LFT elevation (2019)      Abdominal pain (2019)      Nausea & vomiting (2019)        Plan:   (See above)    Signed By:  Sangeeta Mathis PA-C  1/25/2019  11:04 AM

## 2019-01-25 NOTE — PROGRESS NOTES
Bedside and Verbal shift change report given to Delfina Park (oncoming nurse) by Alfredo Robert (offgoing nurse). Report included the following information SBAR, Kardex, Procedure Summary, Intake/Output, MAR and Recent Results.

## 2019-01-26 VITALS
WEIGHT: 171 LBS | SYSTOLIC BLOOD PRESSURE: 112 MMHG | OXYGEN SATURATION: 96 % | RESPIRATION RATE: 18 BRPM | BODY MASS INDEX: 32.28 KG/M2 | TEMPERATURE: 98 F | HEIGHT: 61 IN | HEART RATE: 67 BPM | DIASTOLIC BLOOD PRESSURE: 42 MMHG

## 2019-01-26 LAB
ALBUMIN SERPL-MCNC: 2.3 G/DL (ref 3.5–5)
ALBUMIN/GLOB SERPL: 0.7 {RATIO} (ref 1.1–2.2)
ALP SERPL-CCNC: 125 U/L (ref 45–117)
ALT SERPL-CCNC: 70 U/L (ref 12–78)
ANION GAP SERPL CALC-SCNC: 10 MMOL/L (ref 5–15)
AST SERPL-CCNC: 49 U/L (ref 15–37)
BASOPHILS # BLD: 0 K/UL (ref 0–0.1)
BASOPHILS NFR BLD: 0 % (ref 0–1)
BILIRUB SERPL-MCNC: 0.3 MG/DL (ref 0.2–1)
BUN SERPL-MCNC: 14 MG/DL (ref 6–20)
BUN/CREAT SERPL: 14 (ref 12–20)
CALCIUM SERPL-MCNC: 7.9 MG/DL (ref 8.5–10.1)
CHLORIDE SERPL-SCNC: 107 MMOL/L (ref 97–108)
CO2 SERPL-SCNC: 24 MMOL/L (ref 21–32)
CREAT SERPL-MCNC: 1.02 MG/DL (ref 0.55–1.02)
DIFFERENTIAL METHOD BLD: ABNORMAL
EOSINOPHIL # BLD: 0 K/UL (ref 0–0.4)
EOSINOPHIL NFR BLD: 0 % (ref 0–7)
ERYTHROCYTE [DISTWIDTH] IN BLOOD BY AUTOMATED COUNT: 13 % (ref 11.5–14.5)
GLOBULIN SER CALC-MCNC: 3.4 G/DL (ref 2–4)
GLUCOSE BLD STRIP.AUTO-MCNC: 140 MG/DL (ref 65–100)
GLUCOSE SERPL-MCNC: 170 MG/DL (ref 65–100)
HCT VFR BLD AUTO: 31.5 % (ref 35–47)
HGB BLD-MCNC: 9.4 G/DL (ref 11.5–16)
IMM GRANULOCYTES # BLD AUTO: 0.1 K/UL (ref 0–0.04)
IMM GRANULOCYTES NFR BLD AUTO: 0 % (ref 0–0.5)
LIPASE SERPL-CCNC: 81 U/L (ref 73–393)
LYMPHOCYTES # BLD: 1 K/UL (ref 0.8–3.5)
LYMPHOCYTES NFR BLD: 6 % (ref 12–49)
MCH RBC QN AUTO: 27 PG (ref 26–34)
MCHC RBC AUTO-ENTMCNC: 29.8 G/DL (ref 30–36.5)
MCV RBC AUTO: 90.5 FL (ref 80–99)
MONOCYTES # BLD: 1.4 K/UL (ref 0–1)
MONOCYTES NFR BLD: 10 % (ref 5–13)
NEUTS SEG # BLD: 12.4 K/UL (ref 1.8–8)
NEUTS SEG NFR BLD: 84 % (ref 32–75)
NRBC # BLD: 0 K/UL (ref 0–0.01)
NRBC BLD-RTO: 0 PER 100 WBC
PLATELET # BLD AUTO: 179 K/UL (ref 150–400)
PMV BLD AUTO: 10.4 FL (ref 8.9–12.9)
POTASSIUM SERPL-SCNC: 4.3 MMOL/L (ref 3.5–5.1)
PROT SERPL-MCNC: 5.7 G/DL (ref 6.4–8.2)
RBC # BLD AUTO: 3.48 M/UL (ref 3.8–5.2)
SERVICE CMNT-IMP: ABNORMAL
SODIUM SERPL-SCNC: 141 MMOL/L (ref 136–145)
WBC # BLD AUTO: 14.9 K/UL (ref 3.6–11)

## 2019-01-26 PROCEDURE — 85025 COMPLETE CBC W/AUTO DIFF WBC: CPT

## 2019-01-26 PROCEDURE — 77010033678 HC OXYGEN DAILY

## 2019-01-26 PROCEDURE — 74011250637 HC RX REV CODE- 250/637: Performed by: INTERNAL MEDICINE

## 2019-01-26 PROCEDURE — 94760 N-INVAS EAR/PLS OXIMETRY 1: CPT

## 2019-01-26 PROCEDURE — 80053 COMPREHEN METABOLIC PANEL: CPT

## 2019-01-26 PROCEDURE — 83690 ASSAY OF LIPASE: CPT

## 2019-01-26 PROCEDURE — 82962 GLUCOSE BLOOD TEST: CPT

## 2019-01-26 PROCEDURE — 36415 COLL VENOUS BLD VENIPUNCTURE: CPT

## 2019-01-26 RX ORDER — METFORMIN HYDROCHLORIDE 500 MG/1
500 TABLET ORAL
Status: DISCONTINUED | OUTPATIENT
Start: 2019-01-26 | End: 2019-01-26 | Stop reason: HOSPADM

## 2019-01-26 RX ADMIN — TIMOLOL MALEATE 1 DROP: 5 SOLUTION/ DROPS OPHTHALMIC at 08:33

## 2019-01-26 RX ADMIN — PANTOPRAZOLE SODIUM 40 MG: 40 TABLET, DELAYED RELEASE ORAL at 08:33

## 2019-01-26 NOTE — PROGRESS NOTES
Bedside and Verbal shift change report given to Judy (oncoming nurse) by Grace Jeronimo (offgoing nurse). Report included the following information SBAR, Kardex and ED Summary.

## 2019-01-26 NOTE — PROGRESS NOTES
General Surgery Progress Note      S: Pain controlled, Denies nausea, emesis, SOB or chest pain. Patient Vitals for the past 24 hrs:   Temp Pulse Resp BP SpO2   01/26/19 0753 98 °F (36.7 °C) 69 17 136/63 97 %   01/26/19 0308 98.4 °F (36.9 °C) 64 17 121/58 95 %   01/25/19 2328 98.6 °F (37 °C) 64 17 111/55 93 %   01/25/19 1926 99.7 °F (37.6 °C) 87 17 130/63 92 %   01/25/19 1622 98.2 °F (36.8 °C) 66 16 127/57 97 %   01/25/19 1146 98.2 °F (36.8 °C) 60 16 156/69 95 %   01/25/19 1041 98.3 °F (36.8 °C) 68 12 141/60 95 %   01/25/19 1015 -- 75 13 144/53 96 %   01/25/19 1010 -- 75 11 137/56 96 %   01/25/19 1005 -- 79 10 143/61 96 %   01/25/19 1002 98.5 °F (36.9 °C) -- -- -- --   01/25/19 1000 -- 83 17 143/54 96 %   01/25/19 0955 -- 83 12 143/55 95 %   01/25/19 0950 -- 85 10 150/52 93 %         Date 01/25/19 0700 - 01/26/19 0659 01/26/19 0700 - 01/27/19 0659   Shift 7769-3945 3461-2050 24 Hour Total 4367-3429 4827-7815 24 Hour Total   INTAKE   I.V.(mL/kg/hr) 1500(1.6) 0(0) 1500(0.8) 847.5  847.5     Volume (dextrose 5% - 0.45% NaCl with KCl 20 mEq/L infusion)  0 0 847.5  847.5     Volume (lactated Ringers infusion) 1500  1500      Shift Total(mL/kg) 1500(19.3) 0(0) 1500(19.3) 847. 5(10.9)  847. 5(10.9)   OUTPUT   Shift Total(mL/kg)         NET 1500 0 1500 847.5  847.5   Weight (kg) 77.6 77.6 77.6 77.6 77.6 77.6           Physical Exam:      General: NAD, A&Ox3  Resp: non-labored  CV: RRR  Abdomen: soft, appropriately tender, non-distended.  Incisions c/d/i with Dermabond  Extremity: Warm    Lab Results   Component Value Date/Time    WBC 14.9 (H) 01/26/2019 01:53 AM    HGB 9.4 (L) 01/26/2019 01:53 AM    HCT 31.5 (L) 01/26/2019 01:53 AM    PLATELET 191 02/35/6861 01:53 AM    MCV 90.5 01/26/2019 01:53 AM       Lab Results   Component Value Date/Time    Sodium 141 01/26/2019 01:53 AM    Potassium 4.3 01/26/2019 01:53 AM    Chloride 107 01/26/2019 01:53 AM    CO2 24 01/26/2019 01:53 AM    Anion gap 10 01/26/2019 01:53 AM Glucose 170 (H) 01/26/2019 01:53 AM    BUN 14 01/26/2019 01:53 AM    Creatinine 1.02 01/26/2019 01:53 AM    BUN/Creatinine ratio 14 01/26/2019 01:53 AM    GFR est AA >60 01/26/2019 01:53 AM    GFR est non-AA 52 (L) 01/26/2019 01:53 AM    Calcium 7.9 (L) 01/26/2019 01:53 AM        Lab Results   Component Value Date/Time    INR 1.0 01/23/2019 07:42 AM    Prothrombin time 10.4 01/23/2019 07:42 AM     Lab Results   Component Value Date/Time    ALT (SGPT) 70 01/26/2019 01:53 AM    AST (SGOT) 49 (H) 01/26/2019 01:53 AM    Alk. phosphatase 125 (H) 01/26/2019 01:53 AM    Bilirubin, total 0.3 01/26/2019 01:53 AM           A/P:  77year-old female with gallstone pancreatitis s/p ERCP, POD 1 s/p robot-assisted laparoscopic cholecystectomy. Doing well  WBC up today likely reactive leukocytosis   OK for discharge home today with 2 week outpatient surgery follow-up. Discharge instructions and pain and nausea RX in chart.       Oscar Salamanca MD

## 2019-01-26 NOTE — PROGRESS NOTES
Bedside shift change report given to Marcelo Mathis RN (oncoming nurse) by Aisha Goel RN (offgoing nurse). Report included the following information SBAR, Kardex, Recent Results and Med Rec Status.

## 2019-01-26 NOTE — ROUTINE PROCESS
Pt eager to go home as Gen Surgery cleared them. Attending Physician was contacted for med Rec and Dr. Ary Dexter was not in house so she had me look over the medications and asked to call in the new prescription of Metformin for the patient. Called in metformin prescription for patient.

## 2019-01-26 NOTE — PROGRESS NOTES
Daily Progress and Discharge Note: 1/26/2019  Mary Alford MD    Assessment/Plan:   Pancreatitis / Leukocytosis / LFT elevation / Abdominal pain / Nausea & vomiting / hyperbilirubinemia - POA d/t gallstone pancreatitis s/p lap cholecystectomy 1/25.  -- ERCP 1/24     -- stop IVF   -- Pain control  --GI has signed off  --tolerating diet     Diabetes mellitus: new dx. -- A1c 7.0  --diabetes Ed has seen  --stop humalog, start metformin    Leukocytosis with left shift. Reactive vs early infection, afebrile  --trend      Dehydration - POA due to anorexia and vomiting. Resolved  --stop IVF     Hypertension -   -- Holding lisinopril and Dyazide        Hyperlipidemia -   -- Holding atorvastatin in setting of LFTs and NPO     Glaucoma - continue timolol.     GERD - continue PPI    Dispo: home tomorrow if leukocytosis stabilizes    Addendum: Surgery feels the pt is stable for discharge and leukocytosis was reactive.      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[facility] disease ([facility] ICD-10-CM: E11.29  ICD-9-CM: 250.40  Unknown - Present        Dehydration ICD-10-CM: E86.0  ICD-9-CM: 276.51  1/23/2019 - Present        LFT elevation ICD-10-CM: R94.5  ICD-9-CM: 790.6  1/23/2019 - Present        Abdominal pain ICD-10-CM: R10.9  ICD-9-CM: 789.00  1/23/2019 - Present        Nausea & vomiting ICD-10-CM: R11.2  ICD-9-CM: 787.01  1/23/2019 - Present        GERD (gastroesophageal reflux disease) ICD-10-CM: K21.9  ICD-9-CM: 530.81  Unknown - Present        Glaucoma ICD-10-CM: H40.9  ICD-9-CM: 365.9  Unknown - Present        HTN (hypertension) ICD-10-CM: I10  ICD-9-CM: 401.9  Unknown - Present        Hyperlipidemia ICD-10-CM: E78.5  ICD-9-CM: 272.4  Unknown - Present              HPI:   Ms. Timmy Linton is a 77 y.o.  female who presented to the Emergency Department complaining of abd pain. Sharp, mid upper gastric, radiating to back. Today associated with nausea and vomiting. Started 2 hours after chicken and candy bar lunch yesterday. Similar milder symptoms twice in last 2 months, both times about an hour after eating. ER workup shows pancreatitis, and US suggests gallstones. We will admit her for management. (Dr Matthias Morales)    : She is feeling better today except for a headache. No further nausea or vomiting. Less pain. Has been seen by GI and Surg. Plan for ERCP this afternoon and GB removal when pancreatitis is resolved. She is having some swelling in her right hand so will decrease fluids. AM labs pending. : In OR.     : s/p cholecystectomy. Tolerating PO.  +flatus. Pt c/o swelling in hands and feet. Wants to stop IVF. Wants to transition to PO DM meds. Not interested in checking sugars at home. Review of Systems:   A comprehensive review of systems was negative except for that written in the HPI. Objective:   Physical Exam:     Visit Vitals  /58 (BP 1 Location: Right arm, BP Patient Position: At rest;Supine)   Pulse 64   Temp 98.4 °F (36.9 °C)   Resp 17   Ht 5' 1\" (1.549 m)   Wt 77.6 kg (171 lb)   SpO2 95%   BMI 32.31 kg/m²    O2 Flow Rate (L/min): 2 l/min O2 Device: Nasal cannula    Temp (24hrs), Av.5 °F (36.9 °C), Min:98.2 °F (36.8 °C), Max:99.7 °F (37.6 °C)    701 - 1900  In: 847.5 [I.V.:847.5]  Out: -    1901 -  07  In: 1762.5 [I.V.:1762.5]  Out: -     General:  Alert, cooperative, no distress, appears stated age. Head:  Normocephalic, without obvious abnormality, atraumatic. Eyes:  Conjunctivae/corneas clear. PERRL, EOMs intact. Nose: Nares normal. Septum midline. Mucosa normal. No drainage or sinus tenderness.    Throat: Lips, mucosa, and tongue normal. Teeth and gums normal.   Neck: Supple, symmetrical, trachea midline, no adenopathy, thyroid: no enlargement/tenderness/nodules, no carotid bruit and no JVD. Back:   Symmetric, no curvature. ROM normal. No CVA tenderness. Lungs:   Clear to auscultation bilaterally. Chest wall:  No tenderness or deformity. Heart:  Regular rate and rhythm, S1, S2 normal, no murmur, click, rub or gallop. Abdomen:   Soft, appropriately tender post-op. Incisions C/D. Bowel sounds normal. No masses,  No organomegaly. Extremities: Extremities normal, atraumatic, no cyanosis or edema. No calf tenderness or cords. Pulses: 2+ and symmetric all extremities. Skin: Skin color, texture, turgor normal. No rashes or lesions   Neurologic: CNII-XII intact. Alert and oriented X 3. Fine motor of hands and fingers normal.   equal. Gait not tested at this time. Sensation grossly normal to touch. Gross motor of extremities normal.     Currently in OR  Data Review:       Recent Days:  Recent Labs     01/26/19  0153 01/25/19  0310 01/24/19  0737   WBC 14.9* 7.9 8.2   HGB 9.4* 9.7* 9.8*   HCT 31.5* 32.9* 31.9*    190 191     Recent Labs     01/26/19  0153 01/25/19  0310 01/24/19  0737 01/23/19  0742    142 140  --    K 4.3 4.2 4.3  --     108 105  --    CO2 24 27 26  --    * 121* 137*  --    BUN 14 12 13  --    CREA 1.02 0.90 0.92  --    CA 7.9* 8.3* 8.0*  --    ALB 2.3* 2.5* 2.7*  --    TBILI 0.3 0.4 0.4  --    SGOT 49* 54* 110*  --    ALT 70 91* 126*  --    INR  --   --   --  1.0     No results for input(s): PH, PCO2, PO2, HCO3, FIO2 in the last 72 hours.     24 Hour Results:  Recent Results (from the past 24 hour(s))   GLUCOSE, POC    Collection Time: 01/25/19 11:43 AM   Result Value Ref Range    Glucose (POC) 135 (H) 65 - 100 mg/dL    Performed by Bridgett Lopez (PCT)    GLUCOSE, POC    Collection Time: 01/25/19  4:21 PM   Result Value Ref Range    Glucose (POC) 220 (H) 65 - 100 mg/dL    Performed by Bridgett Lopez (PCT)    GLUCOSE, POC Collection Time: 01/25/19 11:36 PM   Result Value Ref Range    Glucose (POC) 189 (H) 65 - 100 mg/dL    Performed by Tj Lubin (PCT)    CBC WITH AUTOMATED DIFF    Collection Time: 01/26/19  1:53 AM   Result Value Ref Range    WBC 14.9 (H) 3.6 - 11.0 K/uL    RBC 3.48 (L) 3.80 - 5.20 M/uL    HGB 9.4 (L) 11.5 - 16.0 g/dL    HCT 31.5 (L) 35.0 - 47.0 %    MCV 90.5 80.0 - 99.0 FL    MCH 27.0 26.0 - 34.0 PG    MCHC 29.8 (L) 30.0 - 36.5 g/dL    RDW 13.0 11.5 - 14.5 %    PLATELET 034 882 - 310 K/uL    MPV 10.4 8.9 - 12.9 FL    NRBC 0.0 0  WBC    ABSOLUTE NRBC 0.00 0.00 - 0.01 K/uL    NEUTROPHILS 84 (H) 32 - 75 %    LYMPHOCYTES 6 (L) 12 - 49 %    MONOCYTES 10 5 - 13 %    EOSINOPHILS 0 0 - 7 %    BASOPHILS 0 0 - 1 %    IMMATURE GRANULOCYTES 0 0.0 - 0.5 %    ABS. NEUTROPHILS 12.4 (H) 1.8 - 8.0 K/UL    ABS. LYMPHOCYTES 1.0 0.8 - 3.5 K/UL    ABS. MONOCYTES 1.4 (H) 0.0 - 1.0 K/UL    ABS. EOSINOPHILS 0.0 0.0 - 0.4 K/UL    ABS. BASOPHILS 0.0 0.0 - 0.1 K/UL    ABS. IMM. GRANS. 0.1 (H) 0.00 - 0.04 K/UL    DF AUTOMATED     METABOLIC PANEL, COMPREHENSIVE    Collection Time: 01/26/19  1:53 AM   Result Value Ref Range    Sodium 141 136 - 145 mmol/L    Potassium 4.3 3.5 - 5.1 mmol/L    Chloride 107 97 - 108 mmol/L    CO2 24 21 - 32 mmol/L    Anion gap 10 5 - 15 mmol/L    Glucose 170 (H) 65 - 100 mg/dL    BUN 14 6 - 20 MG/DL    Creatinine 1.02 0.55 - 1.02 MG/DL    BUN/Creatinine ratio 14 12 - 20      GFR est AA >60 >60 ml/min/1.73m2    GFR est non-AA 52 (L) >60 ml/min/1.73m2    Calcium 7.9 (L) 8.5 - 10.1 MG/DL    Bilirubin, total 0.3 0.2 - 1.0 MG/DL    ALT (SGPT) 70 12 - 78 U/L    AST (SGOT) 49 (H) 15 - 37 U/L    Alk.  phosphatase 125 (H) 45 - 117 U/L    Protein, total 5.7 (L) 6.4 - 8.2 g/dL    Albumin 2.3 (L) 3.5 - 5.0 g/dL    Globulin 3.4 2.0 - 4.0 g/dL    A-G Ratio 0.7 (L) 1.1 - 2.2     LIPASE    Collection Time: 01/26/19  1:53 AM   Result Value Ref Range    Lipase 81 73 - 393 U/L   GLUCOSE, POC    Collection Time: 01/26/19  5:52 AM   Result Value Ref Range    Glucose (POC) 140 (H) 65 - 100 mg/dL    Performed by Lissa Parkinson (PCT)        Medications reviewed  Current Facility-Administered Medications   Medication Dose Route Frequency    oxyCODONE-acetaminophen (PERCOCET) 5-325 mg per tablet 1 Tab  1 Tab Oral Q4H PRN    oxyCODONE-acetaminophen (PERCOCET 10)  mg per tablet 1 Tab  1 Tab Oral Q4H PRN    lactated Ringers infusion  125 mL/hr IntraVENous CONTINUOUS    glucagon (GLUCAGEN) injection 1 mg  1 mg IntraVENous PRN    dextrose 5% - 0.45% NaCl with KCl 20 mEq/L infusion  75 mL/hr IntraVENous CONTINUOUS    pantoprazole (PROTONIX) tablet 40 mg  40 mg Oral DAILY    naloxone (NARCAN) injection 0.4 mg  0.4 mg IntraVENous PRN    glucose chewable tablet 16 g  4 Tab Oral PRN    dextrose (D50W) injection syrg 12.5-25 g  25-50 mL IntraVENous PRN    glucagon (GLUCAGEN) injection 1 mg  1 mg IntraMUSCular PRN    acetaminophen (TYLENOL) tablet 650 mg  650 mg Oral Q4H PRN    HYDROmorphone (PF) (DILAUDID) injection 0.5 mg  0.5 mg IntraVENous Q4H PRN    diphenhydrAMINE (BENADRYL) capsule 25 mg  25 mg Oral Q4H PRN    ondansetron (ZOFRAN) injection 4 mg  4 mg IntraVENous Q4H PRN    insulin lispro (HUMALOG) injection   SubCUTAneous Q6H    timolol (TIMOPTIC) 0.5 % ophthalmic solution 1 Drop  1 Drop Both Eyes BID           Total time spent with patient and review of records:     Calos Higgins MD

## 2019-01-27 NOTE — DISCHARGE SUMMARY
Physician Discharge Summary     Patient ID:    Adiel Jacques  [id number]  77 y.o.  1940  Kostas Khan MD    Admit date: 1/23/2019    Discharge date and time: 1/27/2019    Admission Diagnoses: Pancreatitis    Chronic Diagnoses:    Problem List as of 1/26/2019 Date Reviewed: 1/23/2019          Codes Class Noted - Resolved    Leukocytosis ICD-10-CM: D72.829  ICD-9-CM: 288.60  1/23/2019 - Present        * (Principal) Pancreatitis ICD-10-CM: K85.90  ICD-9-CM: 742.7  1/23/2019 - Present        DM [facility] 2 [facility] ICD-10-CM: E11.29  ICD-9-CM: 250.40  Unknown - Present        Dehydration ICD-10-CM: E86.0  ICD-9-CM: 276.51  1/23/2019 - Present        LFT elevation ICD-10-CM: R94.5  ICD-9-CM: 790.6  1/23/2019 - Present        Abdominal pain ICD-10-CM: R10.9  ICD-9-CM: 789.00  1/23/2019 - Present        Nausea & vomiting ICD-10-CM: R11.2  ICD-9-CM: 787.01  1/23/2019 - Present        GERD (gastroesophageal reflux disease) ICD-10-CM: K21.9  ICD-9-CM: 530.81  Unknown - Present        Glaucoma ICD-10-CM: H40.9  ICD-9-CM: 365.9  Unknown - Present        HTN (hypertension) ICD-10-CM: I10  ICD-9-CM: 401.9  Unknown - Present        Hyperlipidemia ICD-10-CM: E78.5  ICD-9-CM: 272.4  Unknown - Present              Discharge Medications: Cannot display discharge medications since this patient is not currently admitted. Follow up Care:    1. Kostas Khan MD in 1 wk  2. Dr Kika Moran in 2 wks    Diet:  Diabetic Diet    Disposition:  Home.     Advanced Directive:    Discharge Exam:  [See today's progress note.]    CONSULTATIONS: GI and General Surgery    Significant Diagnostic Studies:   Recent Labs     01/26/19  0153 01/25/19  0310   WBC 14.9* 7.9   HGB 9.4* 9.7*   HCT 31.5* 32.9*    190     Recent Labs     01/26/19  0153 01/25/19  0310 01/24/19  0737    142 140   K 4.3 4.2 4.3    108 105   CO2 24 27 26   BUN 14 12 13   CREA 1.02 0.90 0.92   * 121* 137*   CA 7.9* 8.3* 8.0* Recent Labs     01/26/19  0153 01/25/19  0310 01/24/19  0737   SGOT 49* 54* 110*   * 136* 141*   TP 5.7* 5.9* 5.8*   ALB 2.3* 2.5* 2.7*   GLOB 3.4 3.4 3.1   LPSE 81 176 425*     No results for input(s): INR, PTP, APTT in the last 72 hours. No lab exists for component: INREXT   No results for input(s): FE, TIBC, PSAT, FERR in the last 72 hours. No results for input(s): PH, PCO2, PO2 in the last 72 hours. No results for input(s): CPK, CKMB in the last 72 hours. No lab exists for component: TROPONINI  No components found for: GLPOC  No results found for: TSH, TSH2, TSH3, TSHP, TSHELE, TT3, T3U, T3UP, FRT3, FT3, FT4, FT4P, T4, T4P, FT4T, TT7, TSHEXT          HOSPITAL COURSE & TREATMENT RENDERED:   1. Pt was admitted with gallstone pancreatitis. She was seen by GI and General Surgery and treated with antibiotics. On 1/25 she underwent robotic laparoscopic cholecystectomy. She had a good post op course. She was stable at the time of discharge. Of note, her A1c was found to be 7% at this admission. This is a new dx of diabetes for her. She was seen by the DM educator and given rx for meter. She was also started on metformin when she was taking PO.         Signed:  Chan Lofton MD  1/27/2019  7:26 AM

## 2020-03-12 ENCOUNTER — HOSPITAL ENCOUNTER (OUTPATIENT)
Dept: MAMMOGRAPHY | Age: 80
Discharge: HOME | End: 2020-03-12
Attending: FAMILY MEDICINE
Payer: MEDICARE

## 2020-03-12 DIAGNOSIS — Z12.31 VISIT FOR SCREENING MAMMOGRAM: ICD-10-CM

## 2020-03-12 PROCEDURE — 77067 SCR MAMMO BI INCL CAD: CPT

## 2021-04-14 ENCOUNTER — TRANSCRIBE ORDER (OUTPATIENT)
Dept: SCHEDULING | Age: 81
End: 2021-04-14

## 2021-04-14 DIAGNOSIS — Z12.31 ENCOUNTER FOR SCREENING MAMMOGRAM FOR MALIGNANT NEOPLASM OF BREAST: Primary | ICD-10-CM

## 2021-07-20 ENCOUNTER — HOSPITAL ENCOUNTER (OUTPATIENT)
Dept: MAMMOGRAPHY | Age: 81
Discharge: HOME | End: 2021-07-20
Attending: FAMILY MEDICINE
Payer: MEDICARE

## 2021-07-20 DIAGNOSIS — Z12.31 ENCOUNTER FOR SCREENING MAMMOGRAM FOR MALIGNANT NEOPLASM OF BREAST: ICD-10-CM

## 2021-07-20 PROCEDURE — 77067 SCR MAMMO BI INCL CAD: CPT

## 2022-03-18 PROBLEM — K85.90 PANCREATITIS: Status: ACTIVE | Noted: 2019-01-23

## 2022-03-18 PROBLEM — R10.9 ABDOMINAL PAIN: Status: ACTIVE | Noted: 2019-01-23

## 2022-03-19 PROBLEM — D72.829 LEUKOCYTOSIS: Status: ACTIVE | Noted: 2019-01-23

## 2022-03-19 PROBLEM — R79.89 LFT ELEVATION: Status: ACTIVE | Noted: 2019-01-23

## 2022-03-19 PROBLEM — E86.0 DEHYDRATION: Status: ACTIVE | Noted: 2019-01-23

## 2022-03-20 PROBLEM — R11.2 NAUSEA & VOMITING: Status: ACTIVE | Noted: 2019-01-23

## 2022-07-10 NOTE — PROCEDURES
[address] ALFREDO Pratt MD  [phone]      2019    Endoscopic Retrograde CholangioPancreatography (ERCP) Procedure Note  Inis Bachelor  : 1940  Omero Harry Medical Record Number: [id number]      Indications:   CT suggests filling defect in common bile duct. PCP:  Karla Siemens, MD  Anesthesia/Sedation: General endotracheal anesthesia -- see notes  Endoscopist:  Dr. Jelly Mclain  Complications:  None  Estimated Blood Loss:  None     Permit:  The indications, risks, benefits and alternatives were reviewed with the patient or their decision maker who was provided an opportunity to ask questions and all questions were answered. The specific risks of endoscopic retrograde cholangiopancreatography with conscious sedation were reviewed, including but not limited to anesthetic complication, bleeding, adverse drug reaction, missed lesion, infection, IV site reactions, intestinal perforation which would lead to the need for surgical repair, failed cannulation, and post-ERCP pancreatitis. Specific mention was made of the incidence of post-ERCP pancreatitis, estimated at 5% or 1 out of 20. The patient was advised that although most who develop post-ERCP pancreatitis have mild interstitial pancreatitis, some patients can develop severe necrotizing pancreatitis which could lead to the need for prolonged hospitalization, the need for surgery, the need for antibiotics, the need for blood products, or the potential for subsequent sepsis, multiorgan failure, or even death. The alternatives to ERCP including observation without testing, magnetic resonance cholangiopancreatography, surgery, or percutaneous cholangiopancreatography were reviewed as well as the benefits and limitations of each of the above alternatives.   After considering the options and having all their questions answered, the patient or their decision maker provided both verbal and written consent to proceed. Procedure in Detail:  After obtaining informed consent, positioning of the patient prone, and conduction of a pre-procedure pause or \"time out\" the endoscope was introduced into the mouth and advanced to the duodenum to visualize and instrument the ampullary opening. We approached the ampulla which appears normal.   The punctum is small so I decided to perform wire guided cannulation instead of pressing the instrument. We passed wire with entry into the PD as evidenced by wire heading to the midline. We reapproached and with redirection we got into the biliary tree as evidenced by cranial direction of the wire. Contrast was injected and images interpreted in real time without a radiologist in suite. The gallbladder fills. The duct is small and thin with ratty debris in the distal duct. We performed sphincterotomy and then used a 9mm, 12mm balloon to sweep the duct. A generous quantity of crystalline debris and mucus and sludge was extracted. After sweeping x3 with no additional material removed with finished with an occlusion cholangiogram which showed no stones or leak. Specimens: none           Recommendations: Recover from anesthesia and proceed to cholecystectomy at the surgeon's desire. Thank you for entrusting me with this patient's care. Please do not hesitate to contact me with any questions or if I can be of assistance with any of your other patients' GI needs. Signed By: Brii Bailey MD                        January 24, 2019     Surgical assistant none. Implants none unless specified. UNRESPONSIVE

## 2022-08-03 ENCOUNTER — HOSPITAL ENCOUNTER (EMERGENCY)
Age: 82
Discharge: HOME | End: 2022-08-04
Attending: EMERGENCY MEDICINE
Payer: MEDICARE

## 2022-08-03 VITALS
RESPIRATION RATE: 20 BRPM | TEMPERATURE: 97.5 F | WEIGHT: 168 LBS | HEART RATE: 86 BPM | HEIGHT: 61 IN | DIASTOLIC BLOOD PRESSURE: 42 MMHG | BODY MASS INDEX: 31.72 KG/M2 | SYSTOLIC BLOOD PRESSURE: 127 MMHG | OXYGEN SATURATION: 98 %

## 2022-08-03 DIAGNOSIS — T78.2XXA ANAPHYLAXIS, INITIAL ENCOUNTER: Primary | ICD-10-CM

## 2022-08-03 PROCEDURE — 96372 THER/PROPH/DIAG INJ SC/IM: CPT

## 2022-08-03 PROCEDURE — 96374 THER/PROPH/DIAG INJ IV PUSH: CPT

## 2022-08-03 PROCEDURE — 96375 TX/PRO/DX INJ NEW DRUG ADDON: CPT

## 2022-08-03 PROCEDURE — 99284 EMERGENCY DEPT VISIT MOD MDM: CPT

## 2022-08-03 PROCEDURE — 74011000250 HC RX REV CODE- 250: Performed by: EMERGENCY MEDICINE

## 2022-08-03 PROCEDURE — 74011250636 HC RX REV CODE- 250/636: Performed by: EMERGENCY MEDICINE

## 2022-08-03 RX ORDER — PREDNISONE 10 MG/1
10 TABLET ORAL SEE ADMIN INSTRUCTIONS
Qty: 21 TABLET | Refills: 0 | Status: SHIPPED | OUTPATIENT
Start: 2022-08-03

## 2022-08-03 RX ORDER — EPINEPHRINE 0.3 MG/.3ML
0.3 INJECTION SUBCUTANEOUS
Qty: 1 EACH | Refills: 0 | Status: SHIPPED | OUTPATIENT
Start: 2022-08-03 | End: 2022-08-03

## 2022-08-03 RX ORDER — COLCHICINE 0.6 MG/1
0.6 TABLET ORAL
COMMUNITY

## 2022-08-03 RX ORDER — DIPHENHYDRAMINE HYDROCHLORIDE 50 MG/ML
25 INJECTION, SOLUTION INTRAMUSCULAR; INTRAVENOUS ONCE
Status: COMPLETED | OUTPATIENT
Start: 2022-08-03 | End: 2022-08-03

## 2022-08-03 RX ORDER — EPINEPHRINE 1 MG/ML
0.3 INJECTION, SOLUTION, CONCENTRATE INTRAVENOUS
Status: COMPLETED | OUTPATIENT
Start: 2022-08-03 | End: 2022-08-03

## 2022-08-03 RX ADMIN — SODIUM CHLORIDE, PRESERVATIVE FREE 20 MG: 5 INJECTION INTRAVENOUS at 20:36

## 2022-08-03 RX ADMIN — DIPHENHYDRAMINE HYDROCHLORIDE 25 MG: 50 INJECTION, SOLUTION INTRAMUSCULAR; INTRAVENOUS at 20:36

## 2022-08-03 RX ADMIN — EPINEPHRINE 0.3 MG: 1 INJECTION, SOLUTION, CONCENTRATE INTRAVENOUS at 20:37

## 2022-08-03 RX ADMIN — METHYLPREDNISOLONE SODIUM SUCCINATE 125 MG: 125 INJECTION, POWDER, FOR SOLUTION INTRAMUSCULAR; INTRAVENOUS at 20:36

## 2022-08-04 NOTE — ED TRIAGE NOTES
Pt arrives via ambulance. Pt states that she thinks she is having an allergic reaction but is not sure to what. Pt complains of tongue swelling and itching that occurred earlier in the day. Pt states that she took a dose of benadryl earlier around 1800. Pt vomited right before EMS arrived.

## 2022-08-04 NOTE — ED NOTES
The patient was discharged home by Dr. Lang Fernandes and Giselle Mast RN in stable condition. The patient is alert and oriented, is in no respiratory distress and has vital signs within normal limits . The patient's diagnosis, condition and treatment were explained to patient. The patient/responsible party expressed understanding. Two prescriptions given to pt. No work/school note given to pt. A discharge plan has been developed. A  was not involved in the process. Aftercare instructions were given to the patient. Saline lock removed with no complication. Pt to be transported home by daughter.

## 2022-08-04 NOTE — ED PROVIDER NOTES
79-year-old female with history as below, with no known allergies, presents to the emergency department by EMS stating that she began having allergic reaction today around 1300. She initially developed a rash on her hands and arms that was itchy and then spread across her abdomen and chest.  She took a dose of Benadryl around 1800 to no significant avail of her symptoms. She states that shortly prior to arrival she developed tongue swelling and itching diffusely in her rash and had an episode of nausea and vomiting right before EMS arrived. On arrival to the ED she is speaking in full sentences but notes sensation of tongue swelling and denies any further nausea or vomiting or abdominal pain. No wheezing or shortness of breath. Denies any history of prior anaphylactic reactions. She denies any new food exposures, pets, cosmetics, or any other known triggers. She is on lisinopril but no history of angioedema.        Past Medical History:   Diagnosis Date    GERD (gastroesophageal reflux disease)     Glaucoma     HTN (hypertension)     Hyperlipidemia        Past Surgical History:   Procedure Laterality Date    HX ORTHOPAEDIC           Family History:   Problem Relation Age of Onset    Diabetes Mother     COPD Mother     Coronary Art Dis Father     Diabetes Father     Stroke Father        Social History     Socioeconomic History    Marital status:      Spouse name: Not on file    Number of children: Not on file    Years of education: Not on file    Highest education level: Not on file   Occupational History    Not on file   Tobacco Use    Smoking status: Never    Smokeless tobacco: Never   Substance and Sexual Activity    Alcohol use: No    Drug use: No    Sexual activity: Not on file   Other Topics Concern    Not on file   Social History Narrative    Not on file     Social Determinants of Health     Financial Resource Strain: Not on file   Food Insecurity: Not on file   Transportation Needs: Not on file Physical Activity: Not on file   Stress: Not on file   Social Connections: Not on file   Intimate Partner Violence: Not on file   Housing Stability: Not on file         ALLERGIES: Patient has no known allergies. Review of Systems   Constitutional:  Positive for activity change. Negative for appetite change, chills and fever. HENT:  Positive for facial swelling. Negative for congestion, rhinorrhea, sinus pressure, sneezing and sore throat. Eyes:  Negative for photophobia and visual disturbance. Respiratory:  Negative for cough and shortness of breath. Cardiovascular:  Negative for chest pain. Gastrointestinal:  Positive for nausea and vomiting. Negative for abdominal pain, blood in stool, constipation and diarrhea. Genitourinary:  Negative for difficulty urinating, dysuria, flank pain, frequency, hematuria, menstrual problem, urgency, vaginal bleeding and vaginal discharge. Musculoskeletal:  Negative for arthralgias, back pain, myalgias and neck pain. Skin:  Positive for rash. Negative for wound. Neurological:  Negative for syncope, weakness, numbness and headaches. Psychiatric/Behavioral:  Negative for self-injury and suicidal ideas. All other systems reviewed and are negative. Vitals:    08/03/22 2030 08/03/22 2046 08/03/22 2055   BP: (!) 169/84  (!) 127/42   Pulse: 90  86   Resp: 24  20   Temp: 97.5 °F (36.4 °C)     SpO2: 100% 95% 98%   Weight: 76.2 kg (168 lb)     Height: 5' 1\" (1.549 m)              Physical Exam  Vitals and nursing note reviewed. Constitutional:       General: She is not in acute distress. Appearance: Normal appearance. She is well-developed. She is not diaphoretic. HENT:      Head: Normocephalic and atraumatic. Nose: Nose normal.      Mouth/Throat:      Comments: Mild tongue swelling but fluent speech, no trismus or stridor. Eyes:      Extraocular Movements: Extraocular movements intact.       Conjunctiva/sclera: Conjunctivae normal.      Pupils: Pupils are equal, round, and reactive to light. Cardiovascular:      Rate and Rhythm: Normal rate and regular rhythm. Heart sounds: Normal heart sounds. Pulmonary:      Effort: Pulmonary effort is normal. No respiratory distress. Breath sounds: Normal breath sounds. No wheezing. Abdominal:      General: There is no distension. Palpations: Abdomen is soft. Tenderness: There is no abdominal tenderness. Musculoskeletal:         General: No tenderness. Cervical back: Neck supple. Skin:     General: Skin is warm and dry. Findings: Rash (Diffuse urticaria) present. Neurological:      General: No focal deficit present. Mental Status: She is alert and oriented to person, place, and time. Cranial Nerves: No cranial nerve deficit. Sensory: No sensory deficit. Motor: No weakness. Coordination: Coordination normal.        MDM    58-year-old female presents to the emergency department by EMS with anaphylaxis with unknown trigger. She is afebrile vital signs stable satting normally on room air. She was treated symptomatically with Benadryl, Pepcid, Medrol, EpiPen and was monitored in the ED for 4 hours. She had improvement in her symptoms and had no recurrence. She was able to tolerate PO in the ED without difficulty. She was Rx prednisone taper pack and EpiPen for further symptomatic relief and recommended allergy follow-up for further evaluation and skin testing. Return precautions were given for worsening or concerns. This plan was discussed with the patient at the bedside she stated both understanding and agreement. Please note that this dictation was completed with [employer], the computer voice recognition software. Quite often unanticipated grammatical, syntax, homophones, and other interpretive errors are inadvertently transcribed by the computer software. Please disregard these errors.   Please excuse any errors that have escaped final proofreading.     Total critical care time (not including time spent performing separately reportable procedures): 50 minutes    Procedures

## 2022-09-30 ENCOUNTER — TRANSCRIBE ORDER (OUTPATIENT)
Dept: SCHEDULING | Age: 82
End: 2022-09-30

## 2022-09-30 DIAGNOSIS — Z12.31 VISIT FOR SCREENING MAMMOGRAM: Primary | ICD-10-CM

## 2023-03-02 ENCOUNTER — HOSPITAL ENCOUNTER (OUTPATIENT)
Dept: MAMMOGRAPHY | Age: 83
Discharge: HOME | End: 2023-03-02
Attending: FAMILY MEDICINE
Payer: MEDICARE

## 2023-03-02 DIAGNOSIS — Z12.31 VISIT FOR SCREENING MAMMOGRAM: ICD-10-CM

## 2023-03-02 PROCEDURE — 77067 SCR MAMMO BI INCL CAD: CPT

## (undated) DEVICE — SEAL

## (undated) DEVICE — RETRIEVAL BALLOON CATHETER: Brand: EXTRACTOR™ PRO RX

## (undated) DEVICE — SOLUTION IRRIG 1000ML H2O STRL BLT

## (undated) DEVICE — SNARE ENDOSCP M L240CM W27MM SHTH DIA2.4MM CHN 2.8MM OVL

## (undated) DEVICE — SPECIMEN RETRIEVAL POUCH: Brand: ENDO CATCH GOLD

## (undated) DEVICE — STERILE POLYISOPRENE POWDER-FREE SURGICAL GLOVES WITH EMOLLIENT COATING: Brand: PROTEXIS

## (undated) DEVICE — (D)PREP SKN CHLRAPRP APPL 26ML -- CONVERT TO ITEM 371833

## (undated) DEVICE — BLADELESS OPTICAL TROCAR WITH FIXATION CANNULA: Brand: VERSAPORT

## (undated) DEVICE — APPLICATOR BNDG 1MM ADH PREMIERPRO EXOFIN

## (undated) DEVICE — 3000CC GUARDIAN II: Brand: GUARDIAN

## (undated) DEVICE — APPLICATOR SURG XL L38CM FOR ARISTA ABSRB HEMSTAT FLEXITIP

## (undated) DEVICE — CARTRIDGE CLP LIG HEMLOK GRN --

## (undated) DEVICE — SURGICAL PROCEDURE KIT GEN LAPAROSCOPY LF

## (undated) DEVICE — Device

## (undated) DEVICE — ARM DRAPE

## (undated) DEVICE — BLADELESS OBTURATOR: Brand: WECK VISTA

## (undated) DEVICE — INSTRMT SET WND CLSR SUT PASS --

## (undated) DEVICE — SUTURE PDS II SZ 1 L27IN ABSRB VLT CT-1 L36MM 1/2 CIR Z341H

## (undated) DEVICE — DEVICE TRNSF SPIK STL 2008S] MICROTEK MEDICAL INC]

## (undated) DEVICE — BLOCK BITE BLOX W DENTAL RIM --

## (undated) DEVICE — COVER,MAYO STAND,STERILE: Brand: MEDLINE

## (undated) DEVICE — BALLOON DILATATION CATHETER: Brand: HURRICANE™ RX

## (undated) DEVICE — POSITIONER HD REST SUPINE LAT

## (undated) DEVICE — AGENT HEMSTAT 5GM ARISTA AH

## (undated) DEVICE — TIP COVER ACCESSORY

## (undated) DEVICE — SUTURE MCRYL SZ 4-0 L27IN ABSRB UD L19MM PS-2 1/2 CIR PRIM Y426H

## (undated) DEVICE — DRAPE,REIN 53X77,STERILE: Brand: MEDLINE

## (undated) DEVICE — ELECTRO LUBE IS A SINGLE PATIENT USE DEVICE THAT IS INTENDED TO BE USED ON ELECTROSURGICAL ELECTRODES TO REDUCE STICKING.: Brand: KEY SURGICAL ELECTRO LUBE

## (undated) DEVICE — NEEDLE HYPO 22GA L1.5IN BLK S STL HUB POLYPR SHLD REG BVL

## (undated) DEVICE — DEVICE LCK BILI RAP EXCHG OLPS --

## (undated) DEVICE — SUTURE EASE CROSSBOW CLSR SYS

## (undated) DEVICE — INFECTION CONTROL KIT SYS

## (undated) DEVICE — VISUALIZATION SYSTEM: Brand: CLEARIFY

## (undated) DEVICE — 1200 GUARD II KIT W/5MM TUBE W/O VAC TUBE: Brand: GUARDIAN

## (undated) DEVICE — INFLATION DEVICE: Brand: ENCORE 26

## (undated) DEVICE — BW-412T DISP COMBO CLEANING BRUSH: Brand: SINGLE USE COMBINATION CLEANING BRUSH

## (undated) DEVICE — FORCEPS BX L240CM JAW DIA2.8MM L CAP W/ NDL MIC MESH TOOTH

## (undated) DEVICE — MEDI-VAC NON-CONDUCTIVE SUCTION TUBING: Brand: CARDINAL HEALTH

## (undated) DEVICE — REDUCER CANN ENDOWRIST 12-8MM -- DA VINCI XI - SNGL USE

## (undated) DEVICE — SOL IRRIGATION INJ NACL 0.9% 500ML BTL

## (undated) DEVICE — (D)FCPS GRSP 9MM 230CMLX2.4MM -- DISC BY MFR

## (undated) DEVICE — LIGHT HANDLE: Brand: DEVON

## (undated) DEVICE — Device: Brand: ENDO SMARTCAP

## (undated) DEVICE — SEAL UNIV 5-8MM DISP BX/10 -- DA VINCI XI - SNGL USE

## (undated) DEVICE — GUIDEWIRE ENDOSCP WRK L450CM DIA0.035IN STD SHFT STR RND

## (undated) DEVICE — AIRSEAL BIFURCATED SMOKE EVAC FILTERED TUBE SET: Brand: AIRSEAL

## (undated) DEVICE — TRIPLE LUMEN NEEDLE KNIFE: Brand: RX NEEDLE KNIFE XL

## (undated) DEVICE — REM POLYHESIVE ADULT PATIENT RETURN ELECTRODE: Brand: VALLEYLAB

## (undated) DEVICE — ENDO CARRY-ON PROCEDURE KIT INCLUDES ENZYMATIC SPONGE, GAUZE, BIOHAZARD LABEL, TRAY, LUBRICANT, DIRTY SCOPE LABEL, WATER LABEL, TRAY, DRAWSTRING PAD, AND DEFENDO 4-PIECE KIT.: Brand: ENDO CARRY-ON PROCEDURE KIT

## (undated) DEVICE — DEVON™ KNEE AND BODY STRAP 60" X 3" (1.5 M X 7.6 CM): Brand: DEVON

## (undated) DEVICE — KENDALL SCD EXPRESS SLEEVES, KNEE LENGTH, MEDIUM: Brand: KENDALL SCD

## (undated) DEVICE — TOWEL SURG W17XL27IN STD BLU COT NONFENESTRATED PREWASHED

## (undated) DEVICE — STERILE POLYISOPRENE POWDER-FREE SURGICAL GLOVES: Brand: PROTEXIS

## (undated) DEVICE — SYR 50ML SLIP TIP NSAF LF STRL --